# Patient Record
Sex: MALE | Race: WHITE | Employment: OTHER | URBAN - METROPOLITAN AREA
[De-identification: names, ages, dates, MRNs, and addresses within clinical notes are randomized per-mention and may not be internally consistent; named-entity substitution may affect disease eponyms.]

---

## 2017-07-01 ENCOUNTER — APPOINTMENT (OUTPATIENT)
Dept: MRI IMAGING | Facility: HOSPITAL | Age: 67
DRG: 023 | End: 2017-07-01
Attending: EMERGENCY MEDICINE
Payer: MEDICARE

## 2017-07-01 ENCOUNTER — APPOINTMENT (OUTPATIENT)
Dept: CV DIAGNOSTICS | Facility: HOSPITAL | Age: 67
DRG: 023 | End: 2017-07-01
Attending: Other
Payer: MEDICARE

## 2017-07-01 ENCOUNTER — HOSPITAL ENCOUNTER (INPATIENT)
Facility: HOSPITAL | Age: 67
LOS: 13 days | Discharge: NURSING, REHAB OR LONG-TERM CARE | DRG: 023 | End: 2017-07-14
Attending: EMERGENCY MEDICINE | Admitting: PEDIATRICS
Payer: MEDICARE

## 2017-07-01 ENCOUNTER — APPOINTMENT (OUTPATIENT)
Dept: CT IMAGING | Facility: HOSPITAL | Age: 67
End: 2017-07-01
Payer: MEDICARE

## 2017-07-01 ENCOUNTER — APPOINTMENT (OUTPATIENT)
Dept: GENERAL RADIOLOGY | Facility: HOSPITAL | Age: 67
End: 2017-07-01
Attending: EMERGENCY MEDICINE
Payer: MEDICARE

## 2017-07-01 ENCOUNTER — APPOINTMENT (OUTPATIENT)
Dept: INTERVENTIONAL RADIOLOGY/VASCULAR | Facility: HOSPITAL | Age: 67
DRG: 023 | End: 2017-07-01
Attending: PHYSICIAN ASSISTANT
Payer: MEDICARE

## 2017-07-01 ENCOUNTER — HOSPITAL ENCOUNTER (EMERGENCY)
Facility: HOSPITAL | Age: 67
Discharge: ANOTHER HOSPITAL | End: 2017-07-01
Attending: EMERGENCY MEDICINE
Payer: MEDICARE

## 2017-07-01 ENCOUNTER — APPOINTMENT (OUTPATIENT)
Dept: CT IMAGING | Facility: HOSPITAL | Age: 67
DRG: 023 | End: 2017-07-01
Attending: NURSE PRACTITIONER
Payer: MEDICARE

## 2017-07-01 ENCOUNTER — APPOINTMENT (OUTPATIENT)
Dept: CT IMAGING | Facility: HOSPITAL | Age: 67
DRG: 023 | End: 2017-07-01
Attending: Other
Payer: MEDICARE

## 2017-07-01 ENCOUNTER — APPOINTMENT (OUTPATIENT)
Dept: CT IMAGING | Facility: HOSPITAL | Age: 67
End: 2017-07-01
Attending: EMERGENCY MEDICINE
Payer: MEDICARE

## 2017-07-01 ENCOUNTER — APPOINTMENT (OUTPATIENT)
Dept: CT IMAGING | Facility: HOSPITAL | Age: 67
DRG: 023 | End: 2017-07-01
Payer: MEDICARE

## 2017-07-01 VITALS
BODY MASS INDEX: 23.39 KG/M2 | WEIGHT: 154.31 LBS | HEART RATE: 67 BPM | OXYGEN SATURATION: 97 % | RESPIRATION RATE: 18 BRPM | SYSTOLIC BLOOD PRESSURE: 132 MMHG | DIASTOLIC BLOOD PRESSURE: 78 MMHG | TEMPERATURE: 97 F | HEIGHT: 68 IN

## 2017-07-01 DIAGNOSIS — I77.71 DISSECTION OF CAROTID ARTERY (HCC): ICD-10-CM

## 2017-07-01 DIAGNOSIS — I63.9 ACUTE ISCHEMIC STROKE (HCC): Primary | ICD-10-CM

## 2017-07-01 DIAGNOSIS — I63.9 ACUTE CVA (CEREBROVASCULAR ACCIDENT) (HCC): Primary | ICD-10-CM

## 2017-07-01 DIAGNOSIS — I60.9 SUBARACHNOID BLEED (HCC): ICD-10-CM

## 2017-07-01 LAB
ANION GAP SERPL CALC-SCNC: 10 MMOL/L (ref 0–18)
APTT PPP: 28.1 SECONDS (ref 23.2–35.3)
BACTERIA UR QL AUTO: NEGATIVE /HPF
BASOPHILS # BLD: 0 K/UL (ref 0–0.2)
BASOPHILS NFR BLD: 0 %
BILIRUB UR QL: NEGATIVE
BUN SERPL-MCNC: 20 MG/DL (ref 8–20)
BUN/CREAT SERPL: 23.5 (ref 10–20)
CALCIUM SERPL-MCNC: 9.2 MG/DL (ref 8.5–10.5)
CHLORIDE SERPL-SCNC: 104 MMOL/L (ref 95–110)
CHOLEST SMN-MCNC: 125 MG/DL (ref ?–200)
CLARITY UR: CLEAR
CO2 SERPL-SCNC: 25 MMOL/L (ref 22–32)
COLOR UR: YELLOW
CREAT BLD-MCNC: 0.6 MG/DL (ref 0.5–1.5)
CREAT SERPL-MCNC: 0.85 MG/DL (ref 0.5–1.5)
EOSINOPHIL # BLD: 0 K/UL (ref 0–0.7)
EOSINOPHIL NFR BLD: 0 %
ERYTHROCYTE [DISTWIDTH] IN BLOOD BY AUTOMATED COUNT: 16.7 % (ref 11–15)
EST. AVERAGE GLUCOSE BLD GHB EST-MCNC: 97 MG/DL (ref 68–126)
GLUCOSE BLD-MCNC: 104 MG/DL (ref 65–99)
GLUCOSE BLD-MCNC: 104 MG/DL (ref 65–99)
GLUCOSE BLDC GLUCOMTR-MCNC: 118 MG/DL (ref 70–99)
GLUCOSE SERPL-MCNC: 116 MG/DL (ref 70–99)
GLUCOSE UR-MCNC: NEGATIVE MG/DL
HBA1C MFR BLD HPLC: 5 % (ref ?–5.7)
HCT VFR BLD AUTO: 36.1 % (ref 41–52)
HDLC SERPL-MCNC: 49 MG/DL (ref 45–?)
HDLC SERPL: 2.55 {RATIO} (ref ?–4.97)
HGB BLD-MCNC: 11.5 G/DL (ref 13.5–17.5)
HGB UR QL STRIP.AUTO: NEGATIVE
INR BLD: 1 (ref 0.9–1.2)
INR BLD: 1.17 (ref 0.89–1.11)
ISTAT ACTIVATED CLOTTING TIME: 125 SECONDS (ref 74–137)
ISTAT ACTIVATED CLOTTING TIME: 153 SECONDS (ref 74–137)
ISTAT ACTIVATED CLOTTING TIME: 153 SECONDS (ref 74–137)
ISTAT ACTIVATED CLOTTING TIME: 164 SECONDS (ref 74–137)
ISTAT ACTIVATED CLOTTING TIME: 175 SECONDS (ref 74–137)
ISTAT BLOOD GAS BASE DEFICIT: -3 MMOL/L
ISTAT BLOOD GAS HCO3: 22.5 MEQ/L (ref 22–26)
ISTAT BLOOD GAS O2 SATURATION: 94 % (ref 92–100)
ISTAT BLOOD GAS PCO2: 42 MMHG (ref 35–45)
ISTAT BLOOD GAS PH: 7.33 (ref 7.35–7.45)
ISTAT BLOOD GAS PO2: 76 MMHG (ref 80–105)
ISTAT BLOOD GAS TCO2: 24 MMOL/L (ref 22–32)
ISTAT HEMATOCRIT: 34 % (ref 37–54)
ISTAT IONIZED CALCIUM: 1.15 MMOL/L (ref 1.12–1.32)
ISTAT POTASSIUM: 3.2 MMOL/L (ref 3.6–5.1)
ISTAT SODIUM: 141 MMOL/L (ref 136–144)
LDLC SERPL CALC-MCNC: 66 MG/DL (ref ?–130)
LEUKOCYTE ESTERASE UR QL STRIP.AUTO: NEGATIVE
LYMPHOCYTES # BLD: 0.8 K/UL (ref 1–4)
LYMPHOCYTES NFR BLD: 7 %
MCH RBC QN AUTO: 20.2 PG (ref 27–32)
MCHC RBC AUTO-ENTMCNC: 31.8 G/DL (ref 32–37)
MCV RBC AUTO: 63.4 FL (ref 80–100)
MONOCYTES # BLD: 0.4 K/UL (ref 0–1)
MONOCYTES NFR BLD: 4 %
NEUTROPHILS # BLD AUTO: 10.3 K/UL (ref 1.8–7.7)
NEUTROPHILS NFR BLD: 89 %
NITRITE UR QL STRIP.AUTO: NEGATIVE
NONHDLC SERPL-MCNC: 76 MG/DL (ref ?–130)
OSMOLALITY UR CALC.SUM OF ELEC: 292 MOSM/KG (ref 275–295)
PH UR: 5 [PH] (ref 5–8)
PLATELET # BLD AUTO: 251 K/UL (ref 140–400)
PMV BLD AUTO: 8.8 FL (ref 7.4–10.3)
POTASSIUM SERPL-SCNC: 3.5 MMOL/L (ref 3.3–5.1)
PROT UR-MCNC: NEGATIVE MG/DL
PROTHROMBIN TIME: 12.9 SECONDS (ref 11.8–14.5)
PSA SERPL DL<=0.01 NG/ML-MCNC: 15 SECONDS (ref 12–14.3)
RBC # BLD AUTO: 5.69 M/UL (ref 4.5–5.9)
RBC #/AREA URNS AUTO: 2 /HPF
SODIUM SERPL-SCNC: 139 MMOL/L (ref 136–144)
SP GR UR STRIP: 1.02 (ref 1–1.03)
TRIGLYCERIDES: 49 MG/DL (ref ?–150)
TROPONIN I SERPL-MCNC: 0.01 NG/ML (ref ?–0.03)
TSI SER-ACNC: 2.62 MIU/ML (ref 0.35–5.5)
UROBILINOGEN UR STRIP-ACNC: <2
VIT C UR-MCNC: 40 MG/DL
VLDL: 10 MG/DL (ref 5–40)
WBC # BLD AUTO: 11.6 K/UL (ref 4–11)
WBC #/AREA URNS AUTO: 2 /HPF

## 2017-07-01 PROCEDURE — 99291 CRITICAL CARE FIRST HOUR: CPT

## 2017-07-01 PROCEDURE — 80048 BASIC METABOLIC PNL TOTAL CA: CPT | Performed by: EMERGENCY MEDICINE

## 2017-07-01 PROCEDURE — 70450 CT HEAD/BRAIN W/O DYE: CPT | Performed by: NURSE PRACTITIONER

## 2017-07-01 PROCEDURE — 70551 MRI BRAIN STEM W/O DYE: CPT | Performed by: EMERGENCY MEDICINE

## 2017-07-01 PROCEDURE — 70450 CT HEAD/BRAIN W/O DYE: CPT | Performed by: OTHER

## 2017-07-01 PROCEDURE — 85610 PROTHROMBIN TIME: CPT | Performed by: EMERGENCY MEDICINE

## 2017-07-01 PROCEDURE — 03CL3ZZ EXTIRPATION OF MATTER FROM LEFT INTERNAL CAROTID ARTERY, PERCUTANEOUS APPROACH: ICD-10-PCS

## 2017-07-01 PROCEDURE — 85060 BLOOD SMEAR INTERPRETATION: CPT | Performed by: EMERGENCY MEDICINE

## 2017-07-01 PROCEDURE — 93010 ELECTROCARDIOGRAM REPORT: CPT | Performed by: EMERGENCY MEDICINE

## 2017-07-01 PROCEDURE — 81003 URINALYSIS AUTO W/O SCOPE: CPT | Performed by: EMERGENCY MEDICINE

## 2017-07-01 PROCEDURE — 82330 ASSAY OF CALCIUM: CPT

## 2017-07-01 PROCEDURE — 82962 GLUCOSE BLOOD TEST: CPT

## 2017-07-01 PROCEDURE — 99285 EMERGENCY DEPT VISIT HI MDM: CPT

## 2017-07-01 PROCEDURE — 99291 CRITICAL CARE FIRST HOUR: CPT | Performed by: OTHER

## 2017-07-01 PROCEDURE — 85014 HEMATOCRIT: CPT

## 2017-07-01 PROCEDURE — 93306 TTE W/DOPPLER COMPLETE: CPT | Performed by: OTHER

## 2017-07-01 PROCEDURE — 84484 ASSAY OF TROPONIN QUANT: CPT | Performed by: EMERGENCY MEDICINE

## 2017-07-01 PROCEDURE — 70450 CT HEAD/BRAIN W/O DYE: CPT | Performed by: EMERGENCY MEDICINE

## 2017-07-01 PROCEDURE — 99223 1ST HOSP IP/OBS HIGH 75: CPT | Performed by: HOSPITALIST

## 2017-07-01 PROCEDURE — 85730 THROMBOPLASTIN TIME PARTIAL: CPT | Performed by: EMERGENCY MEDICINE

## 2017-07-01 PROCEDURE — 93005 ELECTROCARDIOGRAM TRACING: CPT

## 2017-07-01 PROCEDURE — 82565 ASSAY OF CREATININE: CPT

## 2017-07-01 PROCEDURE — 70450 CT HEAD/BRAIN W/O DYE: CPT

## 2017-07-01 PROCEDURE — 85347 COAGULATION TIME ACTIVATED: CPT

## 2017-07-01 PROCEDURE — 85025 COMPLETE CBC W/AUTO DIFF WBC: CPT | Performed by: EMERGENCY MEDICINE

## 2017-07-01 PROCEDURE — 70496 CT ANGIOGRAPHY HEAD: CPT | Performed by: EMERGENCY MEDICINE

## 2017-07-01 PROCEDURE — 70498 CT ANGIOGRAPHY NECK: CPT | Performed by: EMERGENCY MEDICINE

## 2017-07-01 PROCEDURE — 84295 ASSAY OF SERUM SODIUM: CPT

## 2017-07-01 PROCEDURE — 80307 DRUG TEST PRSMV CHEM ANLYZR: CPT | Performed by: EMERGENCY MEDICINE

## 2017-07-01 PROCEDURE — 84132 ASSAY OF SERUM POTASSIUM: CPT

## 2017-07-01 PROCEDURE — 36415 COLL VENOUS BLD VENIPUNCTURE: CPT

## 2017-07-01 PROCEDURE — 71010 XR CHEST AP PORTABLE  (CPT=71010): CPT | Performed by: EMERGENCY MEDICINE

## 2017-07-01 PROCEDURE — 82803 BLOOD GASES ANY COMBINATION: CPT

## 2017-07-01 PROCEDURE — B317YZZ FLUOROSCOPY OF LEFT INTERNAL CAROTID ARTERY USING OTHER CONTRAST: ICD-10-PCS

## 2017-07-01 PROCEDURE — 70544 MR ANGIOGRAPHY HEAD W/O DYE: CPT | Performed by: EMERGENCY MEDICINE

## 2017-07-01 RX ORDER — ACETAMINOPHEN 650 MG/1
650 SUPPOSITORY RECTAL EVERY 4 HOURS PRN
Status: DISCONTINUED | OUTPATIENT
Start: 2017-07-01 | End: 2017-07-01

## 2017-07-01 RX ORDER — DOCUSATE SODIUM 100 MG/1
100 CAPSULE, LIQUID FILLED ORAL 2 TIMES DAILY
Status: DISCONTINUED | OUTPATIENT
Start: 2017-07-01 | End: 2017-07-06

## 2017-07-01 RX ORDER — ASPIRIN 325 MG
325 TABLET, DELAYED RELEASE (ENTERIC COATED) ORAL DAILY
Status: DISCONTINUED | OUTPATIENT
Start: 2017-07-01 | End: 2017-07-02

## 2017-07-01 RX ORDER — ONDANSETRON 2 MG/ML
4 INJECTION INTRAMUSCULAR; INTRAVENOUS EVERY 6 HOURS PRN
Status: DISCONTINUED | OUTPATIENT
Start: 2017-07-01 | End: 2017-07-01

## 2017-07-01 RX ORDER — FAMOTIDINE 10 MG/ML
20 INJECTION, SOLUTION INTRAVENOUS DAILY
Status: DISCONTINUED | OUTPATIENT
Start: 2017-07-01 | End: 2017-07-01

## 2017-07-01 RX ORDER — FAMOTIDINE 10 MG/ML
20 INJECTION, SOLUTION INTRAVENOUS DAILY
Status: DISCONTINUED | OUTPATIENT
Start: 2017-07-01 | End: 2017-07-14

## 2017-07-01 RX ORDER — ACETAMINOPHEN 650 MG/1
650 SUPPOSITORY RECTAL EVERY 4 HOURS PRN
Status: DISCONTINUED | OUTPATIENT
Start: 2017-07-01 | End: 2017-07-03

## 2017-07-01 RX ORDER — MORPHINE SULFATE 4 MG/ML
INJECTION, SOLUTION INTRAMUSCULAR; INTRAVENOUS
Status: COMPLETED
Start: 2017-07-01 | End: 2017-07-01

## 2017-07-01 RX ORDER — ATORVASTATIN CALCIUM 80 MG/1
80 TABLET, FILM COATED ORAL NIGHTLY
Status: DISCONTINUED | OUTPATIENT
Start: 2017-07-01 | End: 2017-07-14

## 2017-07-01 RX ORDER — PHENYLEPHRINE HCL IN 0.9% NACL 50MG/250ML
PLASTIC BAG, INJECTION (ML) INTRAVENOUS
Status: DISCONTINUED
Start: 2017-07-01 | End: 2017-07-01

## 2017-07-01 RX ORDER — ACETAMINOPHEN 325 MG/1
650 TABLET ORAL EVERY 4 HOURS PRN
Status: DISCONTINUED | OUTPATIENT
Start: 2017-07-01 | End: 2017-07-01

## 2017-07-01 RX ORDER — BISACODYL 10 MG
10 SUPPOSITORY, RECTAL RECTAL
Status: DISCONTINUED | OUTPATIENT
Start: 2017-07-01 | End: 2017-07-14

## 2017-07-01 RX ORDER — LABETALOL HYDROCHLORIDE 5 MG/ML
10 INJECTION, SOLUTION INTRAVENOUS EVERY 10 MIN PRN
Status: DISCONTINUED | OUTPATIENT
Start: 2017-07-01 | End: 2017-07-01

## 2017-07-01 RX ORDER — FAMOTIDINE 20 MG/1
20 TABLET ORAL DAILY
Status: DISCONTINUED | OUTPATIENT
Start: 2017-07-01 | End: 2017-07-01

## 2017-07-01 RX ORDER — FAMOTIDINE 20 MG/1
20 TABLET ORAL DAILY
Status: DISCONTINUED | OUTPATIENT
Start: 2017-07-01 | End: 2017-07-14

## 2017-07-01 RX ORDER — LORAZEPAM 2 MG/ML
1 INJECTION INTRAMUSCULAR EVERY 6 HOURS PRN
Status: DISCONTINUED | OUTPATIENT
Start: 2017-07-01 | End: 2017-07-14

## 2017-07-01 RX ORDER — HEPARIN SODIUM 5000 [USP'U]/ML
INJECTION, SOLUTION INTRAVENOUS; SUBCUTANEOUS
Status: COMPLETED
Start: 2017-07-01 | End: 2017-07-01

## 2017-07-01 RX ORDER — SODIUM PHOSPHATE, DIBASIC AND SODIUM PHOSPHATE, MONOBASIC 7; 19 G/133ML; G/133ML
1 ENEMA RECTAL ONCE AS NEEDED
Status: DISCONTINUED | OUTPATIENT
Start: 2017-07-01 | End: 2017-07-14

## 2017-07-01 RX ORDER — MORPHINE SULFATE 4 MG/ML
1 INJECTION, SOLUTION INTRAMUSCULAR; INTRAVENOUS EVERY 2 HOUR PRN
Status: DISCONTINUED | OUTPATIENT
Start: 2017-07-01 | End: 2017-07-01

## 2017-07-01 RX ORDER — SODIUM CHLORIDE 9 MG/ML
INJECTION, SOLUTION INTRAVENOUS CONTINUOUS
Status: ACTIVE | OUTPATIENT
Start: 2017-07-01 | End: 2017-07-03

## 2017-07-01 RX ORDER — LABETALOL HYDROCHLORIDE 5 MG/ML
10 INJECTION, SOLUTION INTRAVENOUS EVERY 10 MIN PRN
Status: DISCONTINUED | OUTPATIENT
Start: 2017-07-01 | End: 2017-07-14

## 2017-07-01 RX ORDER — LIDOCAINE HYDROCHLORIDE 10 MG/ML
INJECTION, SOLUTION INFILTRATION; PERINEURAL
Status: COMPLETED
Start: 2017-07-01 | End: 2017-07-01

## 2017-07-01 RX ORDER — LORAZEPAM 2 MG/ML
INJECTION INTRAMUSCULAR
Status: COMPLETED
Start: 2017-07-01 | End: 2017-07-02

## 2017-07-01 RX ORDER — MORPHINE SULFATE 4 MG/ML
2 INJECTION, SOLUTION INTRAMUSCULAR; INTRAVENOUS EVERY 2 HOUR PRN
Status: DISCONTINUED | OUTPATIENT
Start: 2017-07-01 | End: 2017-07-14

## 2017-07-01 RX ORDER — MORPHINE SULFATE 4 MG/ML
2 INJECTION, SOLUTION INTRAMUSCULAR; INTRAVENOUS EVERY 2 HOUR PRN
Status: DISCONTINUED | OUTPATIENT
Start: 2017-07-01 | End: 2017-07-01

## 2017-07-01 RX ORDER — HALOPERIDOL 5 MG/ML
2 INJECTION INTRAMUSCULAR ONCE
Status: COMPLETED | OUTPATIENT
Start: 2017-07-01 | End: 2017-07-01

## 2017-07-01 RX ORDER — HALOPERIDOL 5 MG/ML
2 INJECTION INTRAMUSCULAR EVERY 6 HOURS PRN
Status: DISCONTINUED | OUTPATIENT
Start: 2017-07-02 | End: 2017-07-14

## 2017-07-01 RX ORDER — LORAZEPAM 2 MG/ML
1 INJECTION INTRAMUSCULAR ONCE
Status: DISCONTINUED | OUTPATIENT
Start: 2017-07-01 | End: 2017-07-02

## 2017-07-01 RX ORDER — ACETAMINOPHEN 325 MG/1
650 TABLET ORAL EVERY 4 HOURS PRN
Status: DISCONTINUED | OUTPATIENT
Start: 2017-07-01 | End: 2017-07-03

## 2017-07-01 RX ORDER — POLYETHYLENE GLYCOL 3350 17 G/17G
17 POWDER, FOR SOLUTION ORAL DAILY PRN
Status: DISCONTINUED | OUTPATIENT
Start: 2017-07-01 | End: 2017-07-14

## 2017-07-01 RX ORDER — SODIUM CHLORIDE 9 MG/ML
INJECTION, SOLUTION INTRAVENOUS CONTINUOUS
Status: DISCONTINUED | OUTPATIENT
Start: 2017-07-01 | End: 2017-07-01

## 2017-07-01 RX ORDER — METOCLOPRAMIDE HYDROCHLORIDE 5 MG/ML
10 INJECTION INTRAMUSCULAR; INTRAVENOUS EVERY 8 HOURS PRN
Status: DISCONTINUED | OUTPATIENT
Start: 2017-07-01 | End: 2017-07-14

## 2017-07-01 RX ORDER — LORAZEPAM 2 MG/ML
1 INJECTION INTRAMUSCULAR ONCE
Status: DISCONTINUED | OUTPATIENT
Start: 2017-07-01 | End: 2017-07-14

## 2017-07-01 RX ORDER — METOCLOPRAMIDE HYDROCHLORIDE 5 MG/ML
10 INJECTION INTRAMUSCULAR; INTRAVENOUS EVERY 8 HOURS PRN
Status: DISCONTINUED | OUTPATIENT
Start: 2017-07-01 | End: 2017-07-01

## 2017-07-01 RX ORDER — MORPHINE SULFATE 4 MG/ML
1 INJECTION, SOLUTION INTRAMUSCULAR; INTRAVENOUS EVERY 2 HOUR PRN
Status: DISCONTINUED | OUTPATIENT
Start: 2017-07-01 | End: 2017-07-14

## 2017-07-01 RX ORDER — ONDANSETRON 2 MG/ML
4 INJECTION INTRAMUSCULAR; INTRAVENOUS EVERY 6 HOURS PRN
Status: DISCONTINUED | OUTPATIENT
Start: 2017-07-01 | End: 2017-07-14

## 2017-07-01 RX ORDER — VERAPAMIL HYDROCHLORIDE 2.5 MG/ML
INJECTION, SOLUTION INTRAVENOUS
Status: COMPLETED
Start: 2017-07-01 | End: 2017-07-01

## 2017-07-01 RX ORDER — ASPIRIN 300 MG
300 SUPPOSITORY, RECTAL RECTAL DAILY
Status: DISCONTINUED | OUTPATIENT
Start: 2017-07-01 | End: 2017-07-02

## 2017-07-01 RX ORDER — SENNOSIDES 8.6 MG
17.2 TABLET ORAL NIGHTLY
Status: DISCONTINUED | OUTPATIENT
Start: 2017-07-01 | End: 2017-07-06

## 2017-07-01 RX ORDER — PHENYLEPHRINE HCL IN 0.9% NACL 50MG/250ML
PLASTIC BAG, INJECTION (ML) INTRAVENOUS CONTINUOUS PRN
Status: DISCONTINUED | OUTPATIENT
Start: 2017-07-01 | End: 2017-07-11 | Stop reason: ALTCHOICE

## 2017-07-01 NOTE — PROGRESS NOTES
07/01/17 0848   Clinical Encounter Type   Visited With Patient and family together   Routine Visit Introduction   Continue Visiting Yes   Crisis Visit ED  (Stroke Alert)   Patient's Supportive Strategies/Resources Family including children and spouse

## 2017-07-01 NOTE — PROGRESS NOTES
[place] ER called at 3799- Stroke yellow pt. From [facility] will be arriving. CT/CTA done at [address]  MCA partial thrombosis. Dr. Carmelina Denton notified from [facility]. IR team called in at [phone]. Arrived to dept at 8393. PT. arrived to [facility] ER at 8548.   Upon

## 2017-07-01 NOTE — ED NOTES
To MRI via cart with monitor , stroke RN, pct and RN. Pt alert at this time.  With expressive aphasia, denies pain

## 2017-07-01 NOTE — ED PROVIDER NOTES
Patient Seen in: [facility] AND [facility] Emergency Department    History   Patient presents with:  Altered Mental Status (neurologic)    Stated Complaint: ams    HPI  The patient is a 44-year-old male with no significant past medical history who presents with normal heart sounds and intact distal pulses. No murmur heard. Pulmonary/Chest: Effort normal and breath sounds normal.   Abdominal: Soft. Bowel sounds are normal. He exhibits no distension and no mass. Musculoskeletal: Normal range of motion.  He exh Preliminary result           Please view results for these tests on the individual orders.    DRUG ABUSE PANEL 10 SCREEN   MD BLOOD SMEAR CONSULT   RAINBOW DRAW BLUE   RAINBOW DRAW GOLD   RAINBOW DRAW LAVENDER   RAINBOW DRAW DARK GREEN   RAINBOW  with stable vital signs while in the emergency department.   Patient stable just prior to transfer  Disposition and Plan     Clinical Impression:  Acute CVA (cerebrovascular accident) ([facility] [address]  (primary encounter diagnosis)    Disposition:  Transfer to another

## 2017-07-01 NOTE — PROGRESS NOTES
07/01/17 5730   Clinical Encounter Type   Visited With Family  (Patient resting, RN requested not disturb him.   Prayer offered extended family at bedside.)   Routine Visit Follow-up   Continue Visiting Yes   Patient's Supportive Strategies/Resources Mouna

## 2017-07-01 NOTE — ED PROVIDER NOTES
Patient Seen in: [facility] Emergency Department    History   Patient presents with:  Stroke (neurologic)    Stated Complaint:     HPI    43-year-old male who is usually fairly healthy. He went to bed at 1030 last night.   His wife found him this mo and nontender. No abdominal masses. No peritoneal signs   Extremities: no edema, normal peripheral pulses   Neuro: Alert oriented ×2. Dense right hemiplegia. Right aphasia. Right-sided neglect.   NIH score = 12  Skin: no rashes or nodules         ED Co 14: 15     Approved by: Mya Page MD            Xr Chest Ap Portable  (cpt=71010)    Result Date: 7/1/2017  CONCLUSION:  1. Cardiomegaly. 2. Atherosclerosis. 3. Osteoarthritis.          Ct Stroke Brain (no Iv)(cpt=70450)    Result Date: 7/1/2017  CONCL Aristeo Maurice from neuro interventional was informed. Dr. Deandre Knox was also informed. Case discussed with Dr. Karina Carvalho from the [facility] hospitalist service.     Disposition and Plan     Clinical Impression:  Acute ischemic stroke ([facility] [address]  (primary encounter diagnos

## 2017-07-01 NOTE — PROGRESS NOTES
Anesthesia pre op  Unable to open anes pre eval  Allergies-sulfa  Meds - none  ROS negative  No anesthesia problems, no family Hx anes problems  PS 4E  Hx from wife- explained MAC +- sedation, GA with ETT, art line and PIV.  All questions answered  Gypsy Watson

## 2017-07-01 NOTE — CONSULTS
[facility]  Interventional Neuroradiology  Consultation Note    Dameon Walker Patient Status:  Emergency    1950 MRN [id number]   Location [address] [facility] Attending Trace Willis MD   Hosp Day # 0 [facility] Maldonado Click GENERAL:  Patient is a 77year old male in no acute distress.   HEENT:  Normocephalic, atraumatic  ABD: Soft, non tender    NEUROLOGICAL:    Mental status: AISHWARYA due to aphasia, seems responsive to name  Speech: aphasic, mild dysarthria  Memory and comprehe management          Coni Osborne, [address]  7/1/2017, 9:52 AM  Spectre [phone]      I saw and examined patient, agree with above and the following:    I discussed the procedure, indications, benefits, risks/complications, and alte

## 2017-07-01 NOTE — ED NOTES
Report given to Dylon Melvin at [facility]; Dr. Bull Burrell spoke to Dr. Caffie Buerger (neuro) regarding patient.

## 2017-07-01 NOTE — ED INITIAL ASSESSMENT (HPI)
Patient presents to ER via medics with c/o AMS. Per wife, the patient was found altered and incontinent on the sofa. Last known well time was 10:30 pm last night.

## 2017-07-01 NOTE — CONSULTS
[facility]  Neurocritical Care       Name: Dameon Walker  MRN: [id number]  Admission Date/Time: 7/1/2017  9:35 AM  Primary Care Provider:  Joel Florence        Reason for Consultation:   Acute LMCA stroke.     HPI:   Patient is a [employer]    Unable to obtain. Vitals:   Blood pressure 143/44, pulse 61, temperature 98.1 °F (36.7 °C), temperature source Temporal, resp. rate 16, height 68\", weight 160 lb, SpO2 99 %. Body mass index is 24.33 kg/m².     Intake/Output:  No intake or ou atrophy. WHITE MATTER: Mild chronic white matter ischemic changes. CEREBELLUM: No edema, hemorrhage, mass, acute infarction, or significant atrophy. BRAINSTEM: No edema, hemorrhage, mass, acute infarction, or significant atrophy.   CALVARIUM: No apparent dissection. LEFT INTERNAL CAROTID: No hemodynamically significant stenosis or dissection. VERTEBRAL ARTERIES: RIGHT: No hemodynamically significant stenosis or dissection. LEFT: No hemodynamically significant stenosis or dissection.    BASILAR ARTERY: N INDICATIONS:  eval for stroke  TECHNIQUE:  MRI of the brain was performed without intravenous gadolinium contrast. MR angiography using 3D time of flight without gadolinium contrast was also performed with multi-planar and 3D reconstructed images.  All santana although markedly diminished, left MCA.  Remainder of the intracranial MRA appears normal.   Dictated by: Radha Gaines MD on 7/01/2017 at 10:34     Approved by: Radha Gaines MD               Lab Review      Recent Labs   Lab  07/01/17   7292  07/01/17 minutes of critical care time (exclusive of billable procedures) was administered to manage and/or prevent neurologic instability.  This involved direct patient intervention, complex decision making, and/or extensive discussions with the patient, family, an

## 2017-07-01 NOTE — PROCEDURES
[facility]  Interventional Neuroradiology Procedure Note      Shania Meyers Patient Status:  Emergency    1950 MRN [id number]   Location [facility] [facility] Attending Kristal Mtz MD   Hosp Day # 0 [facility] Roshni Ingram

## 2017-07-01 NOTE — H&P
[facility]  History and Physical     Felicity Schneider Patient Status:  Inpatient    1950 MRN [id number]   [facility] 6NE-A Attending Leisa Melonie Valdovinos*   Hosp Day # 0 PCP Abilio Louis     Chief Complaint:  left upper rhonchi. Cardiovascular: S1, S2. Regular rate and rhythm. No murmurs, rubs or gallops. Equal pulses. Chest and Back: No tenderness or deformity. Abdomen: Soft, nontender, nondistended. Positive bowel sounds. No rebound, guarding or organomegaly.   Kena Marcus Certification    Patient will require inpatient services that will reasonably be expected to span two midnight's based on the clinical documentation in H+P.    Based on patients current state of illness, I anticipate that, after discharge, patient will requ

## 2017-07-01 NOTE — ED INITIAL ASSESSMENT (HPI)
Pt here via ems after being found with stool all over him at home today with right sided weakness. Pt was found by wife. Last know normal was 1030 last night.  Pt arrived via ems after going to [facility] this am. +expressive aphasia. +garbled speech

## 2017-07-02 ENCOUNTER — APPOINTMENT (OUTPATIENT)
Dept: MRI IMAGING | Facility: HOSPITAL | Age: 67
DRG: 023 | End: 2017-07-02
Payer: MEDICARE

## 2017-07-02 ENCOUNTER — APPOINTMENT (OUTPATIENT)
Dept: CT IMAGING | Facility: HOSPITAL | Age: 67
DRG: 023 | End: 2017-07-02
Attending: NURSE PRACTITIONER
Payer: MEDICARE

## 2017-07-02 LAB
ALBUMIN SERPL-MCNC: 3.3 G/DL (ref 3.5–4.8)
ALLENS TEST: POSITIVE
ALP LIVER SERPL-CCNC: 51 U/L (ref 45–117)
ALT SERPL-CCNC: 28 U/L (ref 17–63)
APTT PPP: 31.4 SECONDS (ref 25–34)
ARTERIAL BLD GAS O2 SATURATION: 95 % (ref 92–100)
ARTERIAL BLOOD GAS BASE EXCESS: -1.2
ARTERIAL BLOOD GAS HCO3: 22.5 MEQ/L (ref 22–26)
ARTERIAL BLOOD GAS PCO2: 34 MM HG (ref 35–45)
ARTERIAL BLOOD GAS PH: 7.44 (ref 7.35–7.45)
ARTERIAL BLOOD GAS PO2: 77 MM HG (ref 80–105)
AST SERPL-CCNC: 36 U/L (ref 15–41)
BILIRUB SERPL-MCNC: 1.3 MG/DL (ref 0.1–2)
BILIRUB UR QL STRIP.AUTO: NEGATIVE
BUN BLD-MCNC: 9 MG/DL (ref 8–20)
CALCIUM BLD-MCNC: 7.6 MG/DL (ref 8.3–10.3)
CALCULATED O2 SATURATION: 96 % (ref 92–100)
CARBOXYHEMOGLOBIN: 1.5 % SAT (ref 0–3)
CHLORIDE: 108 MMOL/L (ref 101–111)
CLARITY UR REFRACT.AUTO: CLEAR
CO2: 27 MMOL/L (ref 22–32)
CREAT BLD-MCNC: 0.62 MG/DL (ref 0.7–1.3)
ERYTHROCYTE [DISTWIDTH] IN BLOOD BY AUTOMATED COUNT: 17.4 % (ref 11.5–16)
FIO2: 21 %
GLUCOSE BLD-MCNC: 100 MG/DL (ref 70–99)
GLUCOSE BLD-MCNC: 105 MG/DL (ref 65–99)
GLUCOSE BLD-MCNC: 108 MG/DL (ref 65–99)
GLUCOSE BLD-MCNC: 111 MG/DL (ref 65–99)
GLUCOSE UR STRIP.AUTO-MCNC: NEGATIVE MG/DL
HAV IGM SER QL: 1.7 MG/DL (ref 1.7–3)
HCT VFR BLD AUTO: 30.1 % (ref 37–53)
HGB BLD-MCNC: 9.8 G/DL (ref 13–17)
INR BLD: 1.24 (ref 0.89–1.11)
IONIZED CALCIUM: 1.09 MMOL/L (ref 1.12–1.32)
KETONES UR STRIP.AUTO-MCNC: 20 MG/DL
LACTIC ACID ARTERIAL: 1.7 MMOL/L (ref 0.5–2)
M PROTEIN MFR SERPL ELPH: 6.3 G/DL (ref 6.1–8.3)
MCH RBC QN AUTO: 20 PG (ref 27–33.2)
MCHC RBC AUTO-ENTMCNC: 32.6 G/DL (ref 31–37)
MCV RBC AUTO: 61.4 FL (ref 80–99)
METHEMOGLOBIN: 0.6 % SAT (ref 0.4–1.5)
NITRITE UR QL STRIP.AUTO: NEGATIVE
PATIENT TEMPERATURE: 99.1 F
PH UR STRIP.AUTO: 7 [PH] (ref 4.5–8)
PHOSPHATE SERPL-MCNC: 2.4 MG/DL (ref 2.5–4.9)
PLATELET # BLD AUTO: 279 10(3)UL (ref 150–450)
POTASSIUM BLOOD GAS: 3.3 MMOL/L (ref 3.6–5.1)
POTASSIUM SERPL-SCNC: 3.3 MMOL/L (ref 3.6–5.1)
PROT UR STRIP.AUTO-MCNC: NEGATIVE MG/DL
PSA SERPL DL<=0.01 NG/ML-MCNC: 15.7 SECONDS (ref 12–14.3)
RBC # BLD AUTO: 4.9 X10(6)UL (ref 3.8–5.8)
RED CELL DISTRIBUTION WIDTH-SD: 35.7 FL (ref 35.1–46.3)
SODIUM BLOOD GAS: 138 MMOL/L (ref 136–144)
SODIUM SERPL-SCNC: 141 MMOL/L (ref 136–144)
SP GR UR STRIP.AUTO: 1.01 (ref 1–1.03)
TOTAL HEMOGLOBIN: 10 G/DL (ref 12.6–17.4)
UROBILINOGEN UR STRIP.AUTO-MCNC: <2 MG/DL
WBC # BLD AUTO: 12 X10(3) UL (ref 4–13)

## 2017-07-02 PROCEDURE — 99291 CRITICAL CARE FIRST HOUR: CPT | Performed by: OTHER

## 2017-07-02 PROCEDURE — 70551 MRI BRAIN STEM W/O DYE: CPT

## 2017-07-02 PROCEDURE — 85027 COMPLETE CBC AUTOMATED: CPT | Performed by: PHYSICIAN ASSISTANT

## 2017-07-02 PROCEDURE — 84100 ASSAY OF PHOSPHORUS: CPT | Performed by: NURSE PRACTITIONER

## 2017-07-02 PROCEDURE — 82962 GLUCOSE BLOOD TEST: CPT

## 2017-07-02 PROCEDURE — 85610 PROTHROMBIN TIME: CPT | Performed by: NURSE PRACTITIONER

## 2017-07-02 PROCEDURE — 70450 CT HEAD/BRAIN W/O DYE: CPT | Performed by: NURSE PRACTITIONER

## 2017-07-02 PROCEDURE — 99232 SBSQ HOSP IP/OBS MODERATE 35: CPT | Performed by: HOSPITALIST

## 2017-07-02 PROCEDURE — 85730 THROMBOPLASTIN TIME PARTIAL: CPT | Performed by: NURSE PRACTITIONER

## 2017-07-02 PROCEDURE — 80053 COMPREHEN METABOLIC PANEL: CPT | Performed by: OTHER

## 2017-07-02 PROCEDURE — 83735 ASSAY OF MAGNESIUM: CPT | Performed by: OTHER

## 2017-07-02 RX ORDER — ACETAMINOPHEN 325 MG/1
650 TABLET ORAL EVERY 4 HOURS PRN
Status: DISCONTINUED | OUTPATIENT
Start: 2017-07-02 | End: 2017-07-14

## 2017-07-02 RX ORDER — ACETAMINOPHEN 160 MG/5ML
650 SOLUTION ORAL EVERY 4 HOURS PRN
Status: DISCONTINUED | OUTPATIENT
Start: 2017-07-02 | End: 2017-07-14

## 2017-07-02 RX ORDER — ASPIRIN 325 MG
325 TABLET, DELAYED RELEASE (ENTERIC COATED) ORAL DAILY
Status: DISCONTINUED | OUTPATIENT
Start: 2017-07-02 | End: 2017-07-03

## 2017-07-02 RX ORDER — ASPIRIN 325 MG
325 TABLET, DELAYED RELEASE (ENTERIC COATED) ORAL DAILY
Status: DISCONTINUED | OUTPATIENT
Start: 2017-07-03 | End: 2017-07-02

## 2017-07-02 RX ORDER — ASPIRIN 300 MG
300 SUPPOSITORY, RECTAL RECTAL DAILY
Status: DISCONTINUED | OUTPATIENT
Start: 2017-07-02 | End: 2017-07-03

## 2017-07-02 RX ORDER — DIAZEPAM 5 MG/ML
2.5 INJECTION, SOLUTION INTRAMUSCULAR; INTRAVENOUS ONCE
Status: COMPLETED | OUTPATIENT
Start: 2017-07-02 | End: 2017-07-02

## 2017-07-02 RX ORDER — ACETAMINOPHEN 650 MG/1
650 SUPPOSITORY RECTAL EVERY 4 HOURS PRN
Status: DISCONTINUED | OUTPATIENT
Start: 2017-07-02 | End: 2017-07-14

## 2017-07-02 RX ORDER — HALOPERIDOL 5 MG/ML
1 INJECTION INTRAMUSCULAR EVERY 4 HOURS PRN
Status: DISCONTINUED | OUTPATIENT
Start: 2017-07-02 | End: 2017-07-14

## 2017-07-02 RX ORDER — ASPIRIN 300 MG
300 SUPPOSITORY, RECTAL RECTAL DAILY
Status: DISCONTINUED | OUTPATIENT
Start: 2017-07-03 | End: 2017-07-02

## 2017-07-02 RX ORDER — DEXMEDETOMIDINE HYDROCHLORIDE 4 UG/ML
INJECTION, SOLUTION INTRAVENOUS CONTINUOUS
Status: DISCONTINUED | OUTPATIENT
Start: 2017-07-02 | End: 2017-07-03

## 2017-07-02 NOTE — PHYSICAL THERAPY NOTE
PT evaluation orders received. Awaiting clarification of activity orders. Will follow-up when appropriate.

## 2017-07-02 NOTE — PROGRESS NOTES
[facility]  Neurocritical Care       Subjective: Ty Silvestre is a(n) 77year old male with no PMH Acute LMCA stroke, LICA dissection s/p mechanical thrombectomy with TICI3 reperfusion.  Presence of contrast vs SAH in left sylvian fissur leg rigidity. FINDINGS:   VENTRICLES/SULCI:  Ventricles and sulci are normal in size.   INTRACRANIAL:  There is little significant change in the evolving infarct of the left temporal lobe extending into the left frontoparietal lobe except that the infarc [facility] FINDINGS:   VENTRICLES/SULCI:  Ventricles and sulci are normal in size. INTRACRANIAL:  There is development of low attenuation with loss of gray white matter differentiation in the left temporal lobe extending into the left frontoparietal lobe.  This patient was scanned with CT immediately following extensive neurointerventional revascularization procedure, and is hyperdensity can be due to a combination of vascular permeability related to both the left MCA infarct and the interventional manipulation. OTHER: Negative. Dictated by (CST): Alyson Thorpe MD on 7/01/2017 at 8:46     Approved by (CST): Alyson Thorpe MD on 7/01/2017 at 8:48          CONCLUSION:  1. Cardiomegaly. 2. Atherosclerosis. 3. Osteoarthritis.          Ct Stroke Brain (no Iv)(c IV) STROKE (QWQ=20491/22321)  COMPARISON: [facility], CT STROKE BRAIN (NO IV) (CPT=70450), 7/01/2017, 7:43. INDICATIONS: Altered mental status changes. Stroke.  TIA/ischemia  TECHNIQUE: CT images of the neck and brain were obtained without at 9:44          CONCLUSION:  1. Findings consistent with partial thrombosis of the left middle cerebral artery corresponding to the noncontrast CT findings described previously.  2. Otherwise, normal visualization of the right middle cerebral, right and le cerebral artery, with scant diminished signal blood flow present within M1, M2 and minimally within some peripheral branches, resulting from blood flow by a [place] collaterals.   The anterior and posterior cerebral arteries are patent bilaterally, CA  9.2   --   7.6*   ALB   --    --   3.3*   NA  139   --   141   K  3.5   --   3.3*   CL  104   --   108   CO2  25   --   27.0   ALKPHO   --    --   51   AST   --    --   36   ALT   --    --   28   BILT   --    --   1.3   TP   --    --   6.3       Medi aspirin EC EC tab 325 mg 325 mg Oral Daily   Or      aspirin 300 MG rectal suppository 300 mg 300 mg Rectal Daily   haloperidol lactate (HALDOL) 5 MG/ML injection 2 mg 2 mg Intravenous Q6H PRN   LORazepam (ATIVAN) injection 1 mg 1 mg Intravenous Once   l 120-140  · EKG and cardiac monitor   · Echo      Pulmonary:  · RA   Renal:  · BUN/Cr 9/0.62  · Monitor I/O’s     Intake/Output Summary (Last 24 hours) at 07/02/17 1029  Last data filed at 07/02/17 0400   Gross per 24 hour   Intake             2099 ml   Out

## 2017-07-02 NOTE — PLAN OF CARE
1350 receiveded from NI lab accompanied of anesth. cardene stopped, cristy gtt started to keep bp parameters 120-140mmHg . Left radial a-line. Ledezma. IVF started HOB flat. Right groin site soft, no hematoma, open to air.      Pt alert, restless and slightly ag

## 2017-07-02 NOTE — PROGRESS NOTES
Called to room by RN for increased irritability, agitation and muscle contraction of right leg. Haldol given without result. Upon arrival noted muscle spasms in the right leg along with decerebrate posturing.   Ativan given and patient taken down for CT B 2. Slight better definition of the left MCA distribution infarct involving the temporal lobe and the frontoparietal lobe. There may be slightly more edema in the left cerebral hemisphere with a rightward midline shift of 2 mm.      After scan patient less r

## 2017-07-02 NOTE — PROGRESS NOTES
[facility]  Progress Note     Ashley Bingham Patient Status:  Inpatient    1950 MRN [id number]   [facility] 6NE-A Attending Marciana Buerger [address] Day # 1 PCP Trace Fortune     Chief Complaint: Acute stroke    S: Pa BILT   --    --   1.3   TP   --    --   6.3       Estimated Creatinine Clearance: 113.4 mL/min (based on SCr of 0.62 mg/dL).     Recent Labs   Lab  07/01/17   0740  07/01/17   1457  07/02/17   0400   PTP  12.9  15.0*  15.7*   INR  1.0  1.17*  1.24*

## 2017-07-02 NOTE — PROGRESS NOTES
[facility]  Interventional Neuroradiology Progress Note    Sarbjit Wilkerson Patient Status:  Inpatient    1950 MRN [id number]   [facility] 6NE-A Attending Richard Hinton [address]       Subjective CA 7.6 07/02/2017   ALB 3.3 07/02/2017   ALKPHO 51 07/02/2017   BILT 1.3 07/02/2017   TP 6.3 07/02/2017   AST 36 07/02/2017   ALT 28 07/02/2017   PTT 31.4 07/02/2017   INR 1.24 07/02/2017   PTP 15.7 07/02/2017   TSH 2.620 07/01/2017   MG 1.7 07/02/2017 corresponding to the noncontrast CT findings described previously. 2. Otherwise, normal visualization of the right middle cerebral, right and left anterior and posters to arteries.   3. No significant atherosclerotic changes of the common or internal carot

## 2017-07-03 ENCOUNTER — APPOINTMENT (OUTPATIENT)
Dept: GENERAL RADIOLOGY | Facility: HOSPITAL | Age: 67
DRG: 023 | End: 2017-07-03
Attending: Other
Payer: MEDICARE

## 2017-07-03 ENCOUNTER — APPOINTMENT (OUTPATIENT)
Dept: CT IMAGING | Facility: HOSPITAL | Age: 67
DRG: 023 | End: 2017-07-03
Attending: PHYSICIAN ASSISTANT
Payer: MEDICARE

## 2017-07-03 PROBLEM — G93.6 CEREBRAL EDEMA (HCC): Status: ACTIVE | Noted: 2017-07-03

## 2017-07-03 PROBLEM — E87.6 HYPOKALEMIA: Status: ACTIVE | Noted: 2017-07-03

## 2017-07-03 LAB
BASOPHILS # BLD AUTO: 0.03 X10(3) UL (ref 0–0.1)
BASOPHILS NFR BLD AUTO: 0.2 %
BUN BLD-MCNC: 10 MG/DL (ref 8–20)
CALCIUM BLD-MCNC: 7.9 MG/DL (ref 8.3–10.3)
CHLORIDE: 107 MMOL/L (ref 101–111)
CO2: 26 MMOL/L (ref 22–32)
CREAT BLD-MCNC: 0.63 MG/DL (ref 0.7–1.3)
DEPRECATED HBV CORE AB SER IA-ACNC: 157.8 NG/ML (ref 22–322)
EOSINOPHIL # BLD AUTO: 0.04 X10(3) UL (ref 0–0.3)
EOSINOPHIL NFR BLD AUTO: 0.3 %
ERYTHROCYTE [DISTWIDTH] IN BLOOD BY AUTOMATED COUNT: 17.7 % (ref 11.5–16)
GLUCOSE BLD-MCNC: 102 MG/DL (ref 65–99)
GLUCOSE BLD-MCNC: 103 MG/DL (ref 65–99)
GLUCOSE BLD-MCNC: 107 MG/DL (ref 65–99)
GLUCOSE BLD-MCNC: 108 MG/DL (ref 65–99)
GLUCOSE BLD-MCNC: 93 MG/DL (ref 70–99)
GLUCOSE BLD-MCNC: 99 MG/DL (ref 65–99)
HAV AB SERPL IA-ACNC: 268 PG/ML (ref 193–986)
HAV IGM SER QL: 2.1 MG/DL (ref 1.7–3)
HCT VFR BLD AUTO: 29.4 % (ref 37–53)
HGB BLD-MCNC: 9.4 G/DL (ref 13–17)
IMMATURE GRANULOCYTE COUNT: 0.09 X10(3) UL (ref 0–1)
IMMATURE GRANULOCYTE RATIO %: 0.7 %
INR BLD: 1.24 (ref 0.89–1.11)
IRON SATURATION: 15 % (ref 13–45)
IRON: 40 UG/DL (ref 45–182)
LYMPHOCYTES # BLD AUTO: 1.65 X10(3) UL (ref 0.9–4)
LYMPHOCYTES NFR BLD AUTO: 13.6 %
MCH RBC QN AUTO: 20 PG (ref 27–33.2)
MCHC RBC AUTO-ENTMCNC: 32 G/DL (ref 31–37)
MCV RBC AUTO: 62.7 FL (ref 80–99)
MONOCYTES # BLD AUTO: 1.04 X10(3) UL (ref 0.1–0.6)
MONOCYTES NFR BLD AUTO: 8.6 %
NEUTROPHIL ABS PRELIM: 9.24 X10 (3) UL (ref 1.3–6.7)
NEUTROPHILS # BLD AUTO: 9.24 X10(3) UL (ref 1.3–6.7)
NEUTROPHILS NFR BLD AUTO: 76.6 %
PLATELET # BLD AUTO: 245 10(3)UL (ref 150–450)
POTASSIUM SERPL-SCNC: 3.3 MMOL/L (ref 3.6–5.1)
POTASSIUM SERPL-SCNC: 3.3 MMOL/L (ref 3.6–5.1)
PSA SERPL DL<=0.01 NG/ML-MCNC: 15.7 SECONDS (ref 12–14.3)
RBC # BLD AUTO: 4.69 X10(6)UL (ref 3.8–5.8)
RED CELL DISTRIBUTION WIDTH-SD: 36.6 FL (ref 35.1–46.3)
SODIUM SERPL-SCNC: 141 MMOL/L (ref 136–144)
TOTAL IRON BINDING CAPACITY: 265 UG/DL (ref 298–536)
TRANSFERRIN: 178 MG/DL (ref 200–360)
WBC # BLD AUTO: 12.1 X10(3) UL (ref 4–13)

## 2017-07-03 PROCEDURE — 82962 GLUCOSE BLOOD TEST: CPT

## 2017-07-03 PROCEDURE — 95951 EEG MONITORING/VIDEORECORD: CPT | Performed by: OTHER

## 2017-07-03 PROCEDURE — 70496 CT ANGIOGRAPHY HEAD: CPT | Performed by: PHYSICIAN ASSISTANT

## 2017-07-03 PROCEDURE — 99232 SBSQ HOSP IP/OBS MODERATE 35: CPT | Performed by: HOSPITALIST

## 2017-07-03 PROCEDURE — 70498 CT ANGIOGRAPHY NECK: CPT | Performed by: PHYSICIAN ASSISTANT

## 2017-07-03 PROCEDURE — 71010 XR CHEST AP PORTABLE  (CPT=71010): CPT | Performed by: OTHER

## 2017-07-03 PROCEDURE — 99291 CRITICAL CARE FIRST HOUR: CPT | Performed by: OTHER

## 2017-07-03 RX ORDER — ASPIRIN 300 MG
300 SUPPOSITORY, RECTAL RECTAL DAILY
Status: DISCONTINUED | OUTPATIENT
Start: 2017-07-03 | End: 2017-07-13

## 2017-07-03 RX ORDER — HEPARIN SODIUM AND DEXTROSE 10000; 5 [USP'U]/100ML; G/100ML
12 INJECTION INTRAVENOUS ONCE
Status: DISCONTINUED | OUTPATIENT
Start: 2017-07-03 | End: 2017-07-03

## 2017-07-03 RX ORDER — ASPIRIN 325 MG
325 TABLET, DELAYED RELEASE (ENTERIC COATED) ORAL DAILY
Status: DISCONTINUED | OUTPATIENT
Start: 2017-07-03 | End: 2017-07-13

## 2017-07-03 RX ORDER — HEPARIN SODIUM 5000 [USP'U]/ML
5000 INJECTION, SOLUTION INTRAVENOUS; SUBCUTANEOUS EVERY 8 HOURS SCHEDULED
Status: DISCONTINUED | OUTPATIENT
Start: 2017-07-03 | End: 2017-07-13

## 2017-07-03 RX ORDER — HEPARIN SODIUM AND DEXTROSE 10000; 5 [USP'U]/100ML; G/100ML
INJECTION INTRAVENOUS CONTINUOUS
Status: DISCONTINUED | OUTPATIENT
Start: 2017-07-03 | End: 2017-07-03

## 2017-07-03 RX ORDER — ORPHENADRINE CITRATE 30 MG/ML
60 INJECTION INTRAMUSCULAR; INTRAVENOUS EVERY 12 HOURS PRN
Status: DISCONTINUED | OUTPATIENT
Start: 2017-07-03 | End: 2017-07-14

## 2017-07-03 NOTE — PLAN OF CARE
Pt has remained neurologically stable throughout the day. Pt often sleepy but arousable to tactile and verbal stimuli.   Pt with receptive and expressive aphasia understanding about 50-75% of commands and only able to verbalize with sounds ie \"uh-huh\" an

## 2017-07-03 NOTE — CM/SW NOTE
MSW left a message for the patient's wife to complete dc planning assessment. Awaiting return call.      Kirby Jarvis \[facility]\""NGHIA

## 2017-07-03 NOTE — DIETARY NOTE
NUTRITION INITIAL ASSESSMENT    Pt is at moderate nutrition risk. Pt does not meet malnutrition criteria.     NUTRITION DIAGNOSIS/PROBLEM:    Inadequate oral intake related to inability to consume sufficient energy as evidenced by need for TF, SLP recommend protein per kg)  Fluid: ~1 ml/kcal or per MD discretion    MONITOR AND EVALUATE/NUTRITION GOALS:  3. No signs of skin breakdown  4. Maintain lean body mass  5.  Tolerance of enteral nutrition without signs/symptoms of intolerance (abdominal discomfort/diste

## 2017-07-03 NOTE — PLAN OF CARE
Patient drowsy, easily arousable. Follows simple commands; patient nods or shakes his head, lift up his left arm and left leg. No movement of the right arm but with a slight movement noted on the right leg.  Patient has a low grade temperature and very restl

## 2017-07-03 NOTE — PHYSICAL THERAPY NOTE
Spoke with RN about needing activity orders from neuro prior to initialing PT evaluation. RN in agreement and will f/u after pt's CT scan. Will f/u with pt as schedule permits.

## 2017-07-03 NOTE — PROGRESS NOTES
[facility]  Progress Note     Tyson Younger Patient Status:  Inpatient    1950 MRN [id number]   [facility] 6NE-A Attending Frankie [address]ap[address] Day # 2 PCP Elaezar Kerr     Chief Complaint: Acute stroke    S: Pa --   36   --    ALT   --    --   28   --    BILT   --    --   1.3   --    TP   --    --   6.3   --        Estimated Creatinine Clearance: 111.6 mL/min (based on SCr of 0.63 mg/dL).     Recent Labs   Lab  07/01/17   1457  07/02/17   0400  07/03/17   0427

## 2017-07-03 NOTE — PROGRESS NOTES
[facility] Neurocritical care Progress Note    Eduardo Craig Patient Status:  Inpatient    1950 MRN [id number]   [facility] 6NE-A Attending Hema [facility] Day # 2 PCP Arpit Mayberry     Subjective:  Fr --    GFRNAA  >60  >60   --    --    CA  9.2   --   7.6*  7.9*   ALB   --    --   3.3*   --    NA  139   --   141  141   K  3.5   --   3.3*  3.3*  3.3*   CL  104   --   108  107   CO2  25   --   27.0  26.0   ALKPHO   --    --   51   --    AST   --    -- -140, home medication on hold  - asymptomatic bradycardia in sleep  - monitor vitals    5. Hematology  - monitor H/H, likely has iron def anemia    6.  Renal    Intake/Output Summary (Last 24 hours) at 07/03/17 1043  Last data filed at 07/03/17 0700

## 2017-07-03 NOTE — PROGRESS NOTES
07/03/17 1340   Clinical Encounter Type   Visited With Patient and family together   Anglican Encounters   Anglican Needs Prayer;Bible   Sacramental Encounters   Sacrament of Sick-Anointing Visiting  anointed patient

## 2017-07-03 NOTE — PROGRESS NOTES
[facility]  Interventional Neuroradiology Progress Note    Cape Neddicktierney Alvaradojaison Patient Status:  Inpatient    1950 MRN [id number]   [facility] [place] 6NE-A Attending Alejandro Walters [address] Day # 2 PCP Laura Canales     CC:   BRADY YUSUF HCT 29.4 07/03/2017   .0 07/03/2017   CREATSERUM 0.63 07/03/2017   BUN 10 07/03/2017    07/03/2017   K 3.3 07/03/2017   K 3.3 07/03/2017    07/03/2017   CO2 26.0 07/03/2017   GLU 93 07/03/2017   CA 7.9 07/03/2017   INR 1.24 07/03/2017 - no on statin prior to stroke    Plan:  1. Will repeat CTA Brain + Neck on Friday, monitor small aneurysm  2.  Ischemic order set in place   - ok to continue ASA 300mg IL until passes swallow   - Atorvastatin 80 mg ordered, has not been receiving as he rem

## 2017-07-03 NOTE — CM/SW NOTE
07/03/17 1100   CM/SW Referral Data   Referral Source Physician   Reason for Referral Discharge planning   Informant Spouse   Social History   Recreational Drug/Alcohol Use no   Major Changes Last 6 Months no   Domestic/Partner Violence no   Suicidal Id

## 2017-07-03 NOTE — PLAN OF CARE
New blood pressure parameter per Dr. Annika Feliz is to keep systolic BP between [id number]. Will titrate down neosynephrine as tolerated.

## 2017-07-03 NOTE — OCCUPATIONAL THERAPY NOTE
OCCUPATIONAL THERAPY EVALUATION - INPATIENT     Room Number: 1088/1851-M  Evaluation Date: 7/3/2017  Type of Evaluation: Initial  Presenting Problem: Acute Ischemic Stroke    Physician Order: IP Consult to Occupational Therapy  Reason for Therapy: ADL/IADL Cognitive Status:  Impaired  Arousal/Alertness:  inconsistent responses to stimuli  Attention Span:  difficulty attending to directions and difficulty dividing attention  Orientation Level:  disoriented to place, disoriented to time and disoriented to situ Toileting, which includes using toilet, bedpan or urinal? : Total  -   Putting on and taking off regular upper body clothing?: Total  -   Taking care of personal grooming such as brushing teeth?: Total  -   Eating meals?: Total    AM-PAC Score:  Score: 6 from skilled inpatient OT to address the above deficits, maximizing patient's ability to return to prior level of function. Recommend continued intensive inpatient rehab in an acute rehab setting.   This would aim to maximize independence/safety with self-c

## 2017-07-03 NOTE — OCCUPATIONAL THERAPY NOTE
Spoke with RN about needing activity orders from neuro. RN in agreement and will f/u after pt's CT scan. Will f/u with pt as schedule permits.

## 2017-07-03 NOTE — PROCEDURES
VIDEO ELECTROENCEPHALOGRAM REPORT    Patient Name: Eduardo Craig  Chart ID: [id number]  Ordering Physician: Dr Ekta Cedillo              Start Date: 7/2/2017  Referring Physician: Dr Monika Philippe    End Date: 7/3/2017  Patient Diagnosis: Left MCA stroke.  Rule out foca events noted. All of these noted events correlates with artifact, including movement, muscle, and electrode artifact. SPIKE FILES:  There were no spike files noted.   All of these noted events correlates with artifact, including movement, muscle, and el

## 2017-07-03 NOTE — PROGRESS NOTES
Discussed MRI results with Dr. Parisa Beasley. No new orders. Results relayed to family. All questions answered.       Alicia COSME  Critical Care Nurse Practitioner  [address]

## 2017-07-03 NOTE — SLP NOTE
ADULT SWALLOWING EVALUATION    ASSESSMENT & PLAN   ASSESSMENT  Pt seen at bedside this AM for bedside swallow evaluation. Speech consulted per CVA protocol and failed RN dysphagia screening. Pt cooperative and lethargic.  Pt required constant cues to remain screen    Problem List  Principal Problem:    Acute ischemic stroke [facility])  Active Problems:    Subarachnoid bleed [facility])      Past Medical History  History reviewed. No pertinent past medical history.     Prior Living Situation: Home with support  Diet Prior today.     Acute hemorrhage in the left sylvian fissure and subarachnoid hemorrhage involving the left temporal and parietal lobes redemonstrated without significant change when correlated with CT performed earlier today.           OBJECTIVE   ORAL MOTOR EX

## 2017-07-03 NOTE — PHYSICAL THERAPY NOTE
PHYSICAL THERAPY EVALUATION - INPATIENT     Room Number: 1109/0304-A  Evaluation Date: 7/3/2017  Type of Evaluation: Initial  Physician Order: PT Eval and Treat    Presenting Problem:  (acute CVA with possible SAH conversion)  Reason for Therapy: Mobil patient    SUBJECTIVE  responds yes  And no inconsistently    Patient self-stated goal is unable to state    OBJECTIVE  Precautions: Seizure (-140, currently on bedrest), receptive/expressive aphasia       WEIGHT BEARING RESTRICTION to and from a bed to a chair (including a wheelchair)?: Total   -   Need to walk in hospital room?: Total   -   Climbing 3-5 steps with a railing?: Total       AM-PAC Score:  Raw Score: 8   PT Approx Degree of Impairment Score: 86.62%   Standardized Score training;Neuromuscular re-educate;Range of motion;Strengthening;Transfer training;Balance training  Rehab Potential : Good  Frequency (Obs): Daily  Number of Visits to Meet Established Goals: 5      CURRENT GOALS    Goal #1 Assess bed mobility and transfer

## 2017-07-04 ENCOUNTER — APPOINTMENT (OUTPATIENT)
Dept: CT IMAGING | Facility: HOSPITAL | Age: 67
DRG: 023 | End: 2017-07-04
Attending: HOSPITALIST
Payer: MEDICARE

## 2017-07-04 LAB
FOLATE (FOLIC ACID), SERUM: 19.6 NG/ML (ref 8.7–24)
GLUCOSE BLD-MCNC: 107 MG/DL (ref 65–99)
GLUCOSE BLD-MCNC: 108 MG/DL (ref 65–99)
GLUCOSE BLD-MCNC: 109 MG/DL (ref 65–99)
GLUCOSE BLD-MCNC: 116 MG/DL (ref 65–99)
OSMOLALITY SERUM: 279 MOSM/KG (ref 280–300)
OSMOLALITY SERUM: 286 MOSM/KG (ref 280–300)
POTASSIUM SERPL-SCNC: 3.2 MMOL/L (ref 3.6–5.1)
POTASSIUM SERPL-SCNC: 3.6 MMOL/L (ref 3.6–5.1)
SODIUM SERPL-SCNC: 137 MMOL/L (ref 136–144)
SODIUM SERPL-SCNC: 138 MMOL/L (ref 136–144)
SODIUM SERPL-SCNC: 138 MMOL/L (ref 136–144)

## 2017-07-04 PROCEDURE — 99232 SBSQ HOSP IP/OBS MODERATE 35: CPT | Performed by: HOSPITALIST

## 2017-07-04 PROCEDURE — 99291 CRITICAL CARE FIRST HOUR: CPT | Performed by: OTHER

## 2017-07-04 PROCEDURE — 70450 CT HEAD/BRAIN W/O DYE: CPT | Performed by: HOSPITALIST

## 2017-07-04 PROCEDURE — 82962 GLUCOSE BLOOD TEST: CPT

## 2017-07-04 RX ORDER — POTASSIUM CHLORIDE 14.9 MG/ML
20 INJECTION INTRAVENOUS ONCE
Status: COMPLETED | OUTPATIENT
Start: 2017-07-04 | End: 2017-07-04

## 2017-07-04 NOTE — PROGRESS NOTES
[facility]  Interventional Neuroradiology Progress Note    Irma Grace Patient Status:  Inpatient    1950 MRN [id number]   [facility] 6NE-A Attending Sarah KangLOWELL [facility] Day # 3 PCP Khalif Redding       Subjective Nightly   • Senna  17.2 mg Oral Nightly   • docusate sodium  100 mg Oral BID   • LORazepam  1 mg Intravenous Once         Labs:    Lab Results  Component Value Date   K 3.2 07/04/2017   PGLU 116 07/04/2017       Imaging:  CTA Brain + Carotids 7/3/2017  CON to re-assess aneurysm  3. Ischemic order set in place  1. Ok to continue ASA 300mg CO until passes swallow  2. Atorvastatin 80 mg ordered, has not been receiving as he remains NPO at this time  3.  -140, able to wean off pressor this am  4.  PT/OT/ST

## 2017-07-04 NOTE — PLAN OF CARE
Impaired Swallowing    • Minimize aspiration risk Progressing        NEUROLOGICAL - ADULT    • Achieves stable or improved neurological status Progressing        Patient/Family Goals    • Patient/Family Short Term Goal Progressing        SAFETY ADULT - FAL

## 2017-07-04 NOTE — PROGRESS NOTES
[facility]  Progress Note     Tyson Younger Patient Status:  Inpatient    1950 MRN [id number]   [facility] 6NE-A Attending Sam[facility] Day # 3 PCP Eleazar Kerr     Chief Complaint: Acute stroke    S: Pa of 0.63 mg/dL). Recent Labs   Lab  07/01/17   1457  07/02/17   0400  07/03/17   0429   PTP  15.0*  15.7*  15.7*   INR  1.17*  1.24*  1.24*       No results for input(s): TROP, CK in the last 72 hours. Imaging: Imaging data reviewed in Epic.     Jamilah Duff Friday.

## 2017-07-04 NOTE — PROGRESS NOTES
[facility]    Neuro Critical care progress Note    Katlin Alpa Patient Status:  Inpatient    1950 MRN [id number]   [facility] 6NE-A [facility] PCP Timmy Huber     Subjective:    Sohail May ALKPHO  51   --    --    AST  36   --    --    ALT  28   --    --    BILT  1.3   --    --    TP  6.3   --    --          Assessment/plan  1.  Neuro- left MCA infarction due to left M2 occlusion and left carotid dissection s/p mechanical thrombectomy day 4

## 2017-07-05 ENCOUNTER — APPOINTMENT (OUTPATIENT)
Dept: CT IMAGING | Facility: HOSPITAL | Age: 67
DRG: 023 | End: 2017-07-05
Attending: Other
Payer: MEDICARE

## 2017-07-05 LAB
APTT PPP: 28.5 SECONDS (ref 25–34)
BASOPHILS # BLD AUTO: 0.03 X10(3) UL (ref 0–0.1)
BASOPHILS NFR BLD AUTO: 0.3 %
BUN BLD-MCNC: 11 MG/DL (ref 8–20)
CALCIUM BLD-MCNC: 8.3 MG/DL (ref 8.3–10.3)
CHLORIDE: 104 MMOL/L (ref 101–111)
CO2: 27 MMOL/L (ref 22–32)
CREAT BLD-MCNC: 0.54 MG/DL (ref 0.7–1.3)
EOSINOPHIL # BLD AUTO: 0.28 X10(3) UL (ref 0–0.3)
EOSINOPHIL NFR BLD AUTO: 2.9 %
ERYTHROCYTE [DISTWIDTH] IN BLOOD BY AUTOMATED COUNT: 17.8 % (ref 11.5–16)
GLUCOSE BLD-MCNC: 100 MG/DL (ref 70–99)
GLUCOSE BLD-MCNC: 113 MG/DL (ref 65–99)
GLUCOSE BLD-MCNC: 125 MG/DL (ref 65–99)
HAV IGM SER QL: 1.9 MG/DL (ref 1.7–3)
HCT VFR BLD AUTO: 32.4 % (ref 37–53)
HGB BLD-MCNC: 10.5 G/DL (ref 13–17)
IMMATURE GRANULOCYTE COUNT: 0.04 X10(3) UL (ref 0–1)
IMMATURE GRANULOCYTE RATIO %: 0.4 %
INR BLD: 1.13 (ref 0.89–1.11)
LYMPHOCYTES # BLD AUTO: 1.35 X10(3) UL (ref 0.9–4)
LYMPHOCYTES NFR BLD AUTO: 14.1 %
MCH RBC QN AUTO: 19.8 PG (ref 27–33.2)
MCHC RBC AUTO-ENTMCNC: 32.4 G/DL (ref 31–37)
MCV RBC AUTO: 61 FL (ref 80–99)
MONOCYTES # BLD AUTO: 0.8 X10(3) UL (ref 0.1–0.6)
MONOCYTES NFR BLD AUTO: 8.3 %
NEUTROPHIL ABS PRELIM: 7.1 X10 (3) UL (ref 1.3–6.7)
NEUTROPHILS # BLD AUTO: 7.1 X10(3) UL (ref 1.3–6.7)
NEUTROPHILS NFR BLD AUTO: 74 %
OSMOLALITY SERUM: 282 MOSM/KG (ref 280–300)
OSMOLALITY SERUM: 284 MOSM/KG (ref 280–300)
OSMOLALITY SERUM: 285 MOSM/KG (ref 280–300)
OSMOLALITY SERUM: 285 MOSM/KG (ref 280–300)
PHOSPHATE SERPL-MCNC: 2.9 MG/DL (ref 2.5–4.9)
PLATELET # BLD AUTO: 281 10(3)UL (ref 150–450)
POTASSIUM SERPL-SCNC: 3.5 MMOL/L (ref 3.6–5.1)
POTASSIUM SERPL-SCNC: 4.1 MMOL/L (ref 3.6–5.1)
PSA SERPL DL<=0.01 NG/ML-MCNC: 14.6 SECONDS (ref 12–14.3)
RBC # BLD AUTO: 5.31 X10(6)UL (ref 3.8–5.8)
RED CELL DISTRIBUTION WIDTH-SD: 34.5 FL (ref 35.1–46.3)
SODIUM SERPL-SCNC: 137 MMOL/L (ref 136–144)
SODIUM SERPL-SCNC: 138 MMOL/L (ref 136–144)
WBC # BLD AUTO: 9.6 X10(3) UL (ref 4–13)

## 2017-07-05 PROCEDURE — 99232 SBSQ HOSP IP/OBS MODERATE 35: CPT | Performed by: HOSPITALIST

## 2017-07-05 PROCEDURE — 80048 BASIC METABOLIC PNL TOTAL CA: CPT | Performed by: NURSE PRACTITIONER

## 2017-07-05 PROCEDURE — 85610 PROTHROMBIN TIME: CPT | Performed by: NURSE PRACTITIONER

## 2017-07-05 PROCEDURE — 82962 GLUCOSE BLOOD TEST: CPT

## 2017-07-05 PROCEDURE — 84295 ASSAY OF SERUM SODIUM: CPT | Performed by: OTHER

## 2017-07-05 PROCEDURE — 85730 THROMBOPLASTIN TIME PARTIAL: CPT | Performed by: NURSE PRACTITIONER

## 2017-07-05 PROCEDURE — 85025 COMPLETE CBC W/AUTO DIFF WBC: CPT | Performed by: NURSE PRACTITIONER

## 2017-07-05 PROCEDURE — 99291 CRITICAL CARE FIRST HOUR: CPT | Performed by: OTHER

## 2017-07-05 PROCEDURE — 84100 ASSAY OF PHOSPHORUS: CPT | Performed by: NURSE PRACTITIONER

## 2017-07-05 PROCEDURE — 70450 CT HEAD/BRAIN W/O DYE: CPT | Performed by: OTHER

## 2017-07-05 PROCEDURE — 83735 ASSAY OF MAGNESIUM: CPT | Performed by: NURSE PRACTITIONER

## 2017-07-05 PROCEDURE — 83930 ASSAY OF BLOOD OSMOLALITY: CPT | Performed by: OTHER

## 2017-07-05 RX ORDER — ARIPIPRAZOLE 15 MG/1
40 TABLET ORAL EVERY 4 HOURS
Status: COMPLETED | OUTPATIENT
Start: 2017-07-05 | End: 2017-07-05

## 2017-07-05 NOTE — PLAN OF CARE
Assumed care of pt approx 1910. Pt alert, follows simple commands. Aphasic but can say a few words clearly such as \"thank you. \"  Right arm flaccid, RLE weaker than LLE.   Agitated and restless at times, becomes easily frustrated when unable to communica

## 2017-07-05 NOTE — PROGRESS NOTES
[facility]  Progress Note     Eduardo Craig Patient Status:  Inpatient    1950 MRN [id number]   [facility] 6NE-A Attending Hema [facility] Day # 4 PCP Arpit Mayberry     Chief Complaint: Acute stroke    S: Pa 07/05/17   0510   PTP  15.7*  14.6*   INR  1.24*  1.13*       No results for input(s): TROP, CK in the last 72 hours. Imaging: Imaging data reviewed in Epic.     Medications:   • potassium chloride  40 mEq Per NG Tube Q4H   • mannitol 20% infusion

## 2017-07-05 NOTE — OCCUPATIONAL THERAPY NOTE
OCCUPATIONAL THERAPY TREATMENT NOTE - INPATIENT     Room Number: 7851/6716-T  Session: 1   Number of Visits to Meet Established Goals: 10    Presenting Problem: Acute Ischemic Stroke    History related to current admission: Pt admitted on 7/1/17 with JANET nichole bedpan or urinal? : Total  -   Putting on and taking off regular upper body clothing?: Total  -   Taking care of personal grooming such as brushing teeth?: Total  -   Eating meals?: Total    AM-PAC Score:  Score: 6  Approx Degree of Impairment: 100%  Stand training;Functional transfer training;UE strengthening/ROM; Endurance training;Cognitive reorientation;Patient/Family education;Patient/Family training;Neuromuscluar reeducation; Fine motor coordination activities; Compensatory technique education;Continued e

## 2017-07-05 NOTE — PROGRESS NOTES
[facility]  Neuro critical care progress Note    Katlin Yuen Patient Status:  Inpatient    1950 MRN [id number]   [facility] 6NE-A Attending Guilherme PonceLOWELL [facility] Day # 4  [address]     Subjective:    Chantale Basilio 60 mL Intravenous ONCE PRN   levETIRAcetam (KEPPRA) 250 mg in sodium chloride 0.9 % 100 mL IVPB 250 mg Intravenous Q12H   aspirin EC EC tab 325 mg 325 mg Oral Daily   Or      aspirin 300 MG rectal suppository 300 mg 300 mg Rectal Daily   Orphenadrine Jeremy Foot Min PRN   niCARdipine HCl in NaCl (CARDENE) 20 mg/200 ml premix infusion 5-15 mg/hr Intravenous Continuous PRN   phenylephrine in NaCl (SHANNA-SYNEPHRINE) 50 mg/250 ml premix infusion SOLN 100-200 mcg/min Intravenous Continuous PRN   atorvastatin (LIPITOR) ta --    --   100*   BUN  10   --    --    --    --   11   CREATSERUM  0.63*   --    --    --    --   0.54*   CA  7.9*   --    --    --    --   8.3   NA  141   --    < >  137  138  137  137   K  3.3*  3.3*  3.2*   --   3.6   --   3.5*   CL  107   --    -- 35 minutes to prevent neurologic instability. This involved direct patient intervention complex and decision-making and or extensive discussion with the patient/family and clinical status. (Exclusive of billlable procedures)           Lexii Ahmadi MD

## 2017-07-05 NOTE — PROGRESS NOTES
[facility]  Interventional Neuroradiology Progress Note    Azul ceci Patient Status:  Inpatient    1950 MRN [id number]   [facility] 6NE-A Attending Yecenia [facility] Day # 4 LEELA Ferreira President     CC:   Follow 281.0 07/05/2017   CREATSERUM 0.54 07/05/2017   BUN 11 07/05/2017    07/05/2017    07/05/2017   K 3.5 07/05/2017    07/05/2017   CO2 27.0 07/05/2017    07/05/2017   CA 8.3 07/05/2017   PTT 28.5 07/05/2017   INR 1.13 07/05/2017   PT and thrombectomy resulting in TICI 2b reperfusion, complicated by asymptomatic transient SAH/contrast on post-operative CT head study    Neurologically improved to NIHSS 15 --> 12 without current significant right sided hemineglect and gaze deviation, and

## 2017-07-05 NOTE — PLAN OF CARE
Assumed care of pt at 0730, alert, expressive/receptive aphasia noted, follows most commands, nods/gestures, speaks few words \"thank you, yes, no\", neuros q2, see flowsheet, room air, SR/SB on monitor, SBP goal 110-140, q6 hour accucheck, tube feeding at

## 2017-07-05 NOTE — PHYSICAL THERAPY NOTE
PHYSICAL THERAPY TREATMENT NOTE - INPATIENT    Room Number: 0069/2883-A     Session: 1    Number of Visits to Meet Established Goals: 5    Presenting Problem:  (acute CVA with possible SAH conversion)    Problem List  Principal Problem:    Acute ischemic CL    FUNCTIONAL ABILITY STATUS  Gait Assessment   Gait Assistance: Not tested           Stoop/Curb Assistance: Not tested  Comment : above per FIM    Skilled Therapy Provided: Pt received supine in bed and is agreeable to therapy.  Pt continues to be on be function prior to returning home. DISCHARGE RECOMMENDATIONS  PT Discharge Recommendations: Acute rehabilitation     PLAN  PT Treatment Plan: Bed mobility; Endurance; Patient education; Family education;Gait training;Neuromuscular re-educate;Range of mot

## 2017-07-06 ENCOUNTER — APPOINTMENT (OUTPATIENT)
Dept: GENERAL RADIOLOGY | Facility: HOSPITAL | Age: 67
DRG: 023 | End: 2017-07-06
Attending: HOSPITALIST
Payer: MEDICARE

## 2017-07-06 LAB
APTT PPP: 33.1 SECONDS (ref 25–34)
BASOPHILS # BLD AUTO: 0.02 X10(3) UL (ref 0–0.1)
BASOPHILS NFR BLD AUTO: 0.3 %
BUN BLD-MCNC: 11 MG/DL (ref 8–20)
CALCIUM BLD-MCNC: 8.5 MG/DL (ref 8.3–10.3)
CHLORIDE: 102 MMOL/L (ref 101–111)
CO2: 27 MMOL/L (ref 22–32)
CREAT BLD-MCNC: 0.61 MG/DL (ref 0.7–1.3)
EOSINOPHIL # BLD AUTO: 0.24 X10(3) UL (ref 0–0.3)
EOSINOPHIL NFR BLD AUTO: 3.2 %
ERYTHROCYTE [DISTWIDTH] IN BLOOD BY AUTOMATED COUNT: 18.1 % (ref 11.5–16)
GLUCOSE BLD-MCNC: 101 MG/DL (ref 70–99)
GLUCOSE BLD-MCNC: 107 MG/DL (ref 65–99)
GLUCOSE BLD-MCNC: 120 MG/DL (ref 65–99)
GLUCOSE BLD-MCNC: 137 MG/DL (ref 65–99)
GLUCOSE BLD-MCNC: 99 MG/DL (ref 65–99)
HAV IGM SER QL: 2 MG/DL (ref 1.7–3)
HCT VFR BLD AUTO: 32.3 % (ref 37–53)
HGB BLD-MCNC: 10.5 G/DL (ref 13–17)
IMMATURE GRANULOCYTE COUNT: 0.02 X10(3) UL (ref 0–1)
IMMATURE GRANULOCYTE RATIO %: 0.3 %
INR BLD: 1.12 (ref 0.89–1.11)
LYMPHOCYTES # BLD AUTO: 0.96 X10(3) UL (ref 0.9–4)
LYMPHOCYTES NFR BLD AUTO: 12.7 %
MCH RBC QN AUTO: 20.2 PG (ref 27–33.2)
MCHC RBC AUTO-ENTMCNC: 32.5 G/DL (ref 31–37)
MCV RBC AUTO: 62.2 FL (ref 80–99)
MONOCYTES # BLD AUTO: 0.64 X10(3) UL (ref 0.1–0.6)
MONOCYTES NFR BLD AUTO: 8.4 %
NEUTROPHIL ABS PRELIM: 5.7 X10 (3) UL (ref 1.3–6.7)
NEUTROPHILS # BLD AUTO: 5.7 X10(3) UL (ref 1.3–6.7)
NEUTROPHILS NFR BLD AUTO: 75.1 %
OSMOLALITY SERUM: 280 MOSM/KG (ref 280–300)
PHOSPHATE SERPL-MCNC: 3.3 MG/DL (ref 2.5–4.9)
PLATELET # BLD AUTO: 276 10(3)UL (ref 150–450)
POTASSIUM SERPL-SCNC: 3.6 MMOL/L (ref 3.6–5.1)
POTASSIUM SERPL-SCNC: 4.2 MMOL/L (ref 3.6–5.1)
PSA SERPL DL<=0.01 NG/ML-MCNC: 14.5 SECONDS (ref 12–14.3)
RBC # BLD AUTO: 5.19 X10(6)UL (ref 3.8–5.8)
RED CELL DISTRIBUTION WIDTH-SD: 35 FL (ref 35.1–46.3)
SODIUM SERPL-SCNC: 137 MMOL/L (ref 136–144)
SODIUM SERPL-SCNC: 137 MMOL/L (ref 136–144)
WBC # BLD AUTO: 7.6 X10(3) UL (ref 4–13)

## 2017-07-06 PROCEDURE — 99232 SBSQ HOSP IP/OBS MODERATE 35: CPT | Performed by: HOSPITALIST

## 2017-07-06 PROCEDURE — 82962 GLUCOSE BLOOD TEST: CPT

## 2017-07-06 PROCEDURE — 99233 SBSQ HOSP IP/OBS HIGH 50: CPT | Performed by: OTHER

## 2017-07-06 PROCEDURE — 74230 X-RAY XM SWLNG FUNCJ C+: CPT | Performed by: HOSPITALIST

## 2017-07-06 RX ORDER — LEVETIRACETAM 100 MG/ML
250 SOLUTION ORAL 2 TIMES DAILY
Status: DISCONTINUED | OUTPATIENT
Start: 2017-07-06 | End: 2017-07-11 | Stop reason: SDUPTHER

## 2017-07-06 RX ORDER — ARIPIPRAZOLE 15 MG/1
40 TABLET ORAL EVERY 4 HOURS
Status: COMPLETED | OUTPATIENT
Start: 2017-07-06 | End: 2017-07-06

## 2017-07-06 NOTE — PROGRESS NOTES
[facility]  Progress Note     Carlos Alberto Kaur Patient Status:  Inpatient    1950 MRN [id number]   [facility] 6NE-A Attending [place] [facility] Day # 5 PCP Zina Kawasaki     Chief Complaint: Acute stroke    S: Pa Tube Q4H   • mannitol 20% infusion  30 g Intravenous Q8H   • levETIRAcetam  250 mg Intravenous Q12H   • aspirin EC  325 mg Oral Daily    Or   • aspirin  300 mg Rectal Daily   • Heparin Sodium (Porcine)  5,000 Units Subcutaneous Q8H [address]   • famoTIDine  20 m

## 2017-07-06 NOTE — OCCUPATIONAL THERAPY NOTE
OCCUPATIONAL THERAPY TREATMENT NOTE - INPATIENT     Room Number: 7807/7600-W  Session: 2   Number of Visits to Meet Established Goals: 10    Presenting Problem: Acute Ischemic Stroke    History related to current admission: Pt admitted on 7/1/17 with R dayanara bedpan or urinal? : Total  -   Putting on and taking off regular upper body clothing?: A Lot  -   Taking care of personal grooming such as brushing teeth?: A Lot  -   Eating meals?: A Lot    AM-PAC Score:  Score: 9  Approx Degree of Impairment: 79.59%  Sta activities; Energy conservation/work simplification techniques;ADL training;IADL training;Functional transfer training;UE strengthening/ROM; Endurance training;Cognitive reorientation;Patient/Family education;Patient/Family training;Neuromuscluar reeducation

## 2017-07-06 NOTE — CM/SW NOTE
Spoke w/ wife and pt. They want Mj for rehab. Explained to pt. He was trying to ask questions but couldn't get it out. If he remembers he will ask. Ecin referral to ANGEL[facility]    Pt passed swallow.  Cont dobhoff for now per RN    Cristina Peterson RN/MADAI  NICU  [id number]/

## 2017-07-06 NOTE — CONSULTS
[facility]    Ashley Bingham Patient Status:  Inpatient    1950 MRN [id number]   [facility] 6NE-A Attending Marylou [address] [place] [address] [place] Day # 5 PCP Trace Fortune     Patient Identification  Ashley Bingham is a 77year old ma independent with mobility/ambulation, transfers, ADL's, IADL's.   Support System and Family Circumstances (i.e., potential caregivers): spouse / significant other  Home Environment / Accessibility: 2-story house  Current Functional Status: Minimal to maxima 650 MG rectal suppository 650 mg 650 mg Rectal Q4H PRN   haloperidol lactate (HALDOL) 5 MG/ML injection 1 mg 1 mg Intravenous Q4H PRN   [COMPLETED] iopamidol (ISOVUE-M) 76 % injection 100 mL 100 mL Intravenous ONCE PRN   [COMPLETED] niCARdipine HCl in NaCl Pneumococcal Vac Polyvalent (PNEUMOVAX) injection 0.5 mL 0.5 mL Subcutaneous Prior to discharge   fentaNYL citrate (SUBLIMAZE) 0.05 MG/ML injection 25 mcg 25 mcg Intravenous Q1H PRN   haloperidol lactate (HALDOL) 5 MG/ML injection 2 mg 2 mg Intravenous Q without obvious abnormality, atraumatic. Eyes:  Conjunctivae/lids clear. PERRL, EOMs intact. Vision functional.   Ears/Nose/Throat: Hearing intact. Lips, mucosa, and tongue normal. Teeth and gums normal. Moist mucous membranes.      Neck: No neck masses o for bleeding and hematoma or hemarthrosis (and aggravating anemia) with anticoagulation    PLAN:                   Based on patient's current functional status, pt would benefit from acute inpatient rehabilitation, working with PT/OT/SLP to upgrade present

## 2017-07-06 NOTE — PHYSICAL THERAPY NOTE
PHYSICAL THERAPY TREATMENT NOTE - INPATIENT    Room Number: 0866/4760-B     Session: 2    Number of Visits to Meet Established Goals: 5    Presenting Problem:  (acute CVA with possible SAH conversion)    Problem List  Principal Problem:    Acute ischemic Dependent assistance  Distance (ft): 6 (additional 40ft with walking pants)  Assistive Device:  (HHA)  Pattern: R Foot drag;R Foot flat;L Foot flat;R Flexed knee;R Foot drop  Stoop/Curb Assistance: Not tested  Comment : above per FIM    Skilled Therapy Pro further assessed once off bedrest.  The patient is below his baseline and would benefit from skilled inpatient PT to address the above deficits to assist patient in returning to prior level of function.  The patient scored a 5 on the Modified Webster Scale,

## 2017-07-06 NOTE — PROGRESS NOTES
[facility] Note: Route Optimization for Levetiracetam (KEPPRA)    Patient is currently on Levetiracetam (KEPPRA) 250 mg IV every 12 hours.    The patient meets the criteria to convert to the oral equivalent as established by the IV to PO conversion protoc

## 2017-07-06 NOTE — PROGRESS NOTES
[facility]  Neuro critical care progress Note           Kesha Cardenas Patient Status:  Inpatient    1950 MRN [id number]   [facility] 6NE-A Attending Aziza ByrdNIKITA [facility] Day # 5 PCP Mitchell Cifuentes      Subjective:    40 mEq in sodium chloride 0.9 % 250 mL IVPB 40 mEq Intravenous Once   [COMPLETED] iohexol (OMNIPAQUE) 350 MG/ML injection 60 mL 60 mL Intravenous ONCE PRN   levETIRAcetam (KEPPRA) 250 mg in sodium chloride 0.9 % 100 mL IVPB 250 mg Intravenous Q12H   aspiri injection      [] 0.9%  NaCl infusion  Intravenous Continuous   Labetalol HCl (TRANDATE) injection 10 mg 10 mg Intravenous Q10 Min PRN   niCARdipine HCl in NaCl (CARDENE) 20 mg/200 ml premix infusion 5-15 mg/hr Intravenous Continuous PRN   phenyleph --    --   101*   BUN  11   --    --   11   CREATSERUM  0.54*   --    --   0.61*   CA  8.3   --    --   8.5   NA  137  137  137  138  137  137   K  3.5*  4.1   --   3.6   CL  104   --    --   102   CO2  27.0   --    --   27.0              Assessment/plan

## 2017-07-06 NOTE — PLAN OF CARE
Patient alert, aphasia present-answering simple questions only. Recognizes family and interact appropriately. Follows most simple commands, inconsistently follows complex commands, even when prompted. He is able to gesture and make his needs known.  Unable

## 2017-07-06 NOTE — VIDEO SWALLOW STUDY NOTE
ADULT VIDEOFLUOROSCOPIC SWALLOWING STUDY    Admission Date: 7/1/2017  Evaluation Date: 07/06/17  Radiologist: Katina Bryant    Dear Dr. Bob Pearl,  This letter is to inform you of Sarbjit Wilkerson Videofluoroscopic Swallowing Study results and/or plan of treatment in conjunction with Radiologist.  Patient Positioned: Upright;[facility] chair. Patient Viewed: Lateral.  Patient Alertness: Lethargic; Constant cues requied to stay on task. Consistencies Presented: Thin liquids; Nectar thick liquids; Honey thick liquids;Puree; Stephane Jayna Severity, Location: Mild;Valleculae;Pyriform sinuses; Throughout pharynx  Cleared/Reduced with: Secondary swallow  Laryngeal Penetration:  (None)  Tracheal Aspiration:  (None)  Strategy(ies) Implemented (Puree):  Slow rate;Small bites and sips  Effectiveness meal, Eliminate distractions, Supervision with meals with min assistance 100 % of the time across 1-2 sessions.    Goal #4 Communication evaluation to be completed     EDUCATION/INSTRUCTION  Reviewed results and recommendations with patient/family/caregiver

## 2017-07-06 NOTE — PROGRESS NOTES
07/06/17 0925   Clinical Encounter Type   Visited With Patient and family together  (wife)   Continue Visiting Yes   Surgical Visit Post-op   Patient's Supportive Strategies/Resources Identified pt.'s strong family support includes his wife, children an

## 2017-07-06 NOTE — PLAN OF CARE
Assumed care of pt at 0730, alert, global aphasia but receptive aphasia improving, pt able to verbalize more each day, \"thank you, yes, no, I dont care, that's my wife\", right arm flaccid, see flowsheet for full neuro assessment, room air, lungs clear, S

## 2017-07-07 ENCOUNTER — APPOINTMENT (OUTPATIENT)
Dept: CT IMAGING | Facility: HOSPITAL | Age: 67
DRG: 023 | End: 2017-07-07
Attending: NURSE PRACTITIONER
Payer: MEDICARE

## 2017-07-07 LAB
APTT PPP: 36.6 SECONDS (ref 25–34)
BASOPHILS # BLD AUTO: 0.03 X10(3) UL (ref 0–0.1)
BASOPHILS NFR BLD AUTO: 0.4 %
BUN BLD-MCNC: 14 MG/DL (ref 8–20)
CALCIUM BLD-MCNC: 8.7 MG/DL (ref 8.3–10.3)
CHLORIDE: 100 MMOL/L (ref 101–111)
CO2: 24 MMOL/L (ref 22–32)
CREAT BLD-MCNC: 0.58 MG/DL (ref 0.7–1.3)
EOSINOPHIL # BLD AUTO: 0.26 X10(3) UL (ref 0–0.3)
EOSINOPHIL NFR BLD AUTO: 3.6 %
ERYTHROCYTE [DISTWIDTH] IN BLOOD BY AUTOMATED COUNT: 17.8 % (ref 11.5–16)
GLUCOSE BLD-MCNC: 116 MG/DL (ref 70–99)
HAV IGM SER QL: 2.1 MG/DL (ref 1.7–3)
HCT VFR BLD AUTO: 33.2 % (ref 37–53)
HGB BLD-MCNC: 10.8 G/DL (ref 13–17)
IMMATURE GRANULOCYTE COUNT: 0.03 X10(3) UL (ref 0–1)
IMMATURE GRANULOCYTE RATIO %: 0.4 %
INR BLD: 1.13 (ref 0.89–1.11)
LYMPHOCYTES # BLD AUTO: 1.32 X10(3) UL (ref 0.9–4)
LYMPHOCYTES NFR BLD AUTO: 18.3 %
MCH RBC QN AUTO: 19.9 PG (ref 27–33.2)
MCHC RBC AUTO-ENTMCNC: 32.5 G/DL (ref 31–37)
MCV RBC AUTO: 61.1 FL (ref 80–99)
MONOCYTES # BLD AUTO: 0.56 X10(3) UL (ref 0.1–0.6)
MONOCYTES NFR BLD AUTO: 7.7 %
NEUTROPHIL ABS PRELIM: 5.03 X10 (3) UL (ref 1.3–6.7)
NEUTROPHILS # BLD AUTO: 5.03 X10(3) UL (ref 1.3–6.7)
NEUTROPHILS NFR BLD AUTO: 69.6 %
PHOSPHATE SERPL-MCNC: 3.9 MG/DL (ref 2.5–4.9)
PLATELET # BLD AUTO: 313 10(3)UL (ref 150–450)
POTASSIUM SERPL-SCNC: 3.7 MMOL/L (ref 3.6–5.1)
PSA SERPL DL<=0.01 NG/ML-MCNC: 14.6 SECONDS (ref 12–14.3)
RBC # BLD AUTO: 5.43 X10(6)UL (ref 3.8–5.8)
RED CELL DISTRIBUTION WIDTH-SD: 34.5 FL (ref 35.1–46.3)
SODIUM SERPL-SCNC: 134 MMOL/L (ref 136–144)
WBC # BLD AUTO: 7.2 X10(3) UL (ref 4–13)

## 2017-07-07 PROCEDURE — 70496 CT ANGIOGRAPHY HEAD: CPT | Performed by: NURSE PRACTITIONER

## 2017-07-07 PROCEDURE — 99232 SBSQ HOSP IP/OBS MODERATE 35: CPT | Performed by: HOSPITALIST

## 2017-07-07 PROCEDURE — 70498 CT ANGIOGRAPHY NECK: CPT | Performed by: NURSE PRACTITIONER

## 2017-07-07 PROCEDURE — 99233 SBSQ HOSP IP/OBS HIGH 50: CPT | Performed by: OTHER

## 2017-07-07 RX ORDER — ARIPIPRAZOLE 15 MG/1
40 TABLET ORAL ONCE
Status: COMPLETED | OUTPATIENT
Start: 2017-07-07 | End: 2017-07-07

## 2017-07-07 NOTE — SLP NOTE
SPEECH/LANGUAGE/COGNITIVE EVALUATION - INPATIENT    Admission Date: 7/1/2017  Evaluation Date: 07/07/17    Reason for Referral: Stroke protocol;RN dysphagia screen    ASSESSMENT & PLAN   ASSESSMENT  Pt seen sitting upright in chair this PM for communicatio Impaired   Patient Experiencing Pain: No    PLAN FOR TREATMENT  Continued speech therapy services    GOALS  Goal #1 The patient will follow 1 step directions with max cues with 80 % accuracy within 3-4  session(s).    Goal #2 The patient will identify concr Date: 07/10/17    Thank you for your referral.  If you have any questions please contact Yvone Councilman, M.S. [address] [facility]  Pager [phone]

## 2017-07-07 NOTE — PLAN OF CARE
Problem: SLP ADULT - COMMUNICATION, IMPAIRED  Goal: By Discharge: Demonstrates communication skills at highest level of function for planned discharge setting. See evaluation for individualized goals.   Outcome: Progressing

## 2017-07-07 NOTE — PROGRESS NOTES
[facility]  Neuro critical care progress Note           Eduardo Craig Patient Status:  Inpatient    1950 MRN [id number]   [facility]NE-A [facility] Arpit Mayberry      Subjective:    Tube Q4H   [COMPLETED] potassium chloride 40 mEq in sodium chloride 0.9 % 250 mL IVPB 40 mEq Intravenous Once   Followed by      [COMPLETED] potassium chloride IVPB premix 20 mEq 20 mEq Intravenous Once   [COMPLETED] mannitol 20 % 64.3 g in 321.5 mL infusi (PF) 4 MG/ML injection 2 mg 2 mg Intravenous Q2H PRN   famoTIDine (PEPCID) tab 20 mg 20 mg Oral Daily   Or      famoTIDine (PEPCID) injection 20 mg 20 mg Intravenous Daily   [COMPLETED] Verapamil HCl (ISOPTIN) 2.5 MG/ML injection      [] 0.9%  NaCl 32.3*  33.2*   MCV  61.0*  62.2*  61.1*   MCH  19.8*  20.2*  19.9*   MCHC  32.4  32.5  32.5   RDW  17.8*  18.1*  17.8*   NEPRELIM  7.10*  5.70  5.03   WBC  9.6  7.6  7.2   PLT  281.0  276.0  313.0       Recent Labs   Lab  07/05/17   0510   07/05/17   1674 PT/OT  F- Family at bedside, all questions answered  G- No central line or smtyh     Proph:  - heparin SC for DVT prophylaxis  - pepcid     Dispo:   -Full code     Goal for the day: RIA NIX consultation. Possible transfer to floor today.     Thank you for al

## 2017-07-07 NOTE — PROGRESS NOTES
[facility]  Progress Note     Tessie Arias Patient Status:  Inpatient    1950 MRN [id number]   [facility] 6NE-A Attending George [facility] Day # 6 PCP Venus Lugo     Chief Complaint: Acute stroke    S: Pa 07/07/17   0554   PTP  14.6*  14.5*  14.6*   INR  1.13*  1.12*  1.13*       No results for input(s): TROP, CK in the last 72 hours. Imaging: Imaging data reviewed in Epic.     Medications:   • potassium chloride  40 mEq Per NG Tube Once   • levETIR working on acute rehab placement.

## 2017-07-07 NOTE — DIETARY NOTE
NUTRITION FOLLOW UP ASSESSMENT     Pt is at moderate nutrition risk.  Pt does not meet malnutrition criteria.     NUTRITION DIAGNOSIS/PROBLEM:     Inadequate oral intake related to inability to consume sufficient energy as evidenced by need for TF supplemen followed: n/a      FOOD/NUTRITION STATUS:  Appetite: Fair  Intake: >50 % of Meals   Intake Meeting Needs: Yes- with TF supplementation at night    ANTHROPOMETRICS:  Ht: 172.7 cm (5' 8\")  Wt: 62.5 kg (137.5 lb).  This is 89 % of IBW  BMI: Body mass index is

## 2017-07-07 NOTE — PHYSICAL THERAPY NOTE
PHYSICAL THERAPY TREATMENT NOTE - INPATIENT    Room Number: 3980/6997-N     Session: 3    Number of Visits to Meet Established Goals: 5    Presenting Problem:  (acute CVA with possible SAH conversion)    Problem List  Principal Problem:    Acute ischemic Assessment   Gait Assistance: Dependent assistance  Distance (ft): 20  Assistive Device: Other (Comment) (hemiwalker)  Pattern: R Foot drag  Stoop/Curb Assistance: Not tested  Comment : above per FIM    Skilled Therapy Provided: Pt received sitting up in b cognitive impairments, and limited communication.  These impairments manifest themselves as functional limitations in functional mobility skills to be further assessed once off bedrest.  The patient is below his baseline and would benefit from skilled inpat

## 2017-07-07 NOTE — CM/SW NOTE
Harrison Shannon from [facility] states they have pt \"pencilled\" in for a bed for tomorrow. If he is not medically cleared tomorrow, they may have to give that bed away. Discussed above with Jina Bay.     Roslyn Saab, 07/07/17, 2:35 PM

## 2017-07-07 NOTE — SLP NOTE
SPEECH DAILY NOTE - INPATIENT    Evaluation Date: 07/07/17    ASSESSMENT & PLAN   ASSESSMENT  Pt seen at bedside this PM for speech therapy services. Pt cooperative, pleasant, and alert. Per RN, pt tolerating diet well with no concerns of aspiration.  Pt's      FOLLOW UP  Follow Up Needed: Yes  SLP Follow-up Date: 07/10/17  Number of Visits to Meet Established Goals: 5  Session: 1/5    If you have any questions, please contact ANTONY Cates  Pager [phone]

## 2017-07-07 NOTE — PLAN OF CARE
Patient alert, answering simple questions with 1-2 word answers, having difficulty answering orientation questions. When asked yes/no orientation questions, he answers yes/no correctly. He recognizes family and friends and interacts appropriately.  Particip

## 2017-07-07 NOTE — PROGRESS NOTES
[facility]  Interventional Neuroradiology Progress Note    Gail Mckee Patient Status:  Inpatient    1950 MRN [id number]   [facility] 6NE-A Attending Lauren [facility] Day # 6 PCP Dayami Sylvester     CC:   Follow 3.7 07/07/2017    07/07/2017   CO2 24.0 07/07/2017    07/07/2017   CA 8.7 07/07/2017   PTT 36.6 07/07/2017   INR 1.13 07/07/2017   PTP 14.6 07/07/2017   MG 2.1 07/07/2017   PHOS 3.9 07/07/2017       Imaging:  CTA Brain + Carotids 7/7/2017  CON

## 2017-07-07 NOTE — OCCUPATIONAL THERAPY NOTE
OCCUPATIONAL THERAPY TREATMENT NOTE - INPATIENT     Room Number: 6133/9238-C  Session: 3  Number of Visits to Meet Established Goals: 10    Presenting Problem: Acute Ischemic Stroke    History related to current admission: Pt admitted on 7/1/17 with R side Toileting, which includes using toilet, bedpan or urinal? : A Lot  -   Putting on and taking off regular upper body clothing?: A Little  -   Taking care of personal grooming such as brushing teeth?: A Lot  -   Eating meals?: A Little    AM-PAC Score:  Scor impairments: activity tolerance, ability to follow directions, insight into deficits, R UE strength, R UE AROM, endurance, attention, alertness, awareness, and overall cognitive performance. D/t multiple impairments, pt is considered high complexity.  Recom

## 2017-07-08 LAB — POTASSIUM SERPL-SCNC: 3.8 MMOL/L (ref 3.6–5.1)

## 2017-07-08 PROCEDURE — 99232 SBSQ HOSP IP/OBS MODERATE 35: CPT | Performed by: HOSPITALIST

## 2017-07-08 PROCEDURE — 99233 SBSQ HOSP IP/OBS HIGH 50: CPT | Performed by: OTHER

## 2017-07-08 RX ORDER — POTASSIUM CHLORIDE 20 MEQ/1
40 TABLET, EXTENDED RELEASE ORAL ONCE
Status: COMPLETED | OUTPATIENT
Start: 2017-07-08 | End: 2017-07-08

## 2017-07-08 NOTE — DIETARY NOTE
Nutrition Short Note Calorie Count    Day 1 of Calorie count (Breakfast, lunch, dinner on 7/7) Pt consumed 3 meals of data. Pt received 744 kcals and 32 gms of protein, which meets approximately 40% of calorie and protein needs.  RD to follow up with Day 2

## 2017-07-08 NOTE — PROGRESS NOTES
[facility]  Progress Note     Allison Rochester Patient Status:  Inpatient    1950 MRN [id number]   [facility]NE-A [facility]     Chief Complaint: Acute stroke    S: Pa 1.12*  1.13*       No results for input(s): TROP, CK in the last 72 hours. Imaging: Imaging data reviewed in Epic.     Medications:   • levETIRAcetam  250 mg Per NG Tube BID   • aspirin EC  325 mg Oral Daily    Or   • aspirin  300 mg Rectal Daily

## 2017-07-08 NOTE — PLAN OF CARE
Resumed care at 0730. Alert and oriented x3-4 with difficultly and slurred talking. Wife at bedside. Telemetry monitor reading SR. PT today. Neuro q4, Am assessment unchanged. Fall precautions maintained per protocol.  Call light in reach at the chair/bedsi

## 2017-07-08 NOTE — OCCUPATIONAL THERAPY NOTE
OCCUPATIONAL THERAPY TREATMENT NOTE - INPATIENT     Room Number: 8312/7189-R  Session: 4/10  Number of Visits to Meet Established Goals: 10    Presenting Problem: Acute Ischemic Stroke    History related to current admission: Pt admitted on 7/1/17 with JANET lubin Toileting, which includes using toilet, bedpan or urinal? : A Lot  -   Putting on and taking off regular upper body clothing?: A Lot  -   Taking care of personal grooming such as brushing teeth?: A Lot  -   Eating meals?: A Little    AM-PAC Score:  Score Patient appears highly motivated to regain function and is recommended to have acute rehab at [place] to maximize independence and safety for ADLs and mobility.      OT Discharge Recommendations: Acute rehabilitation  OT Device Recommendations: None    PLAN  OT

## 2017-07-08 NOTE — PROGRESS NOTES
[id number] [facility] Progress Note    Fernando Guardado Patient Status:  Inpatient    1950 MRN [id number]   [facility] 7NE-A [facility] P[facility] KERA Rowe         Subjective:  Fernando Guardado the left MCA distribution consistent with evolving infarction. Stable midline shift to the right of approximately 3 mm with stable mass effect on the left cerebral hemisphere and left lateral ventricle when compared to CT scan performed earlier today. intake  2. Hypertension  3.  Hyperlipidemia       Neurologically stable, sitting up in the chair, accepted for acute rehab  Currently on asa 325 mg with decision when to start [facility] [place] per NI    Dr Stack Ou to follow       CARMELINA Reyes stimuli throughout   Coord: FNF intact with no tremor or dysmetria on L; unable to do on R  Romberg: deferred  Gait: deferred    Imaging:   Xr Video Swallow (cpt=[id number])    Result Date: 7/6/2017  PROCEDURE:  SWALLOWING STUDY  TECHNIQUE:  Video fluoroscopic to improve, still with aphasia and R hemiparesis, Arm > Leg but improving     Plan:   - A1C 5.0, LDL 66- continue Lipitor 80 mg daily   - maintain -140  - continue  mg daily for now; timing of anticoagulation as per NI - NI following   - cont

## 2017-07-08 NOTE — PROGRESS NOTES
[id number] [facility] Progress Note    Louisa Owens Patient Status:  Inpatient    1950 MRN [id number]   [facility] 7NE-A Attending Manish Champion[address] Day # 7 PCP KERA Robertson         Subjective:  Louisa Owens and basal ganglia in the left MCA distribution consistent with evolving infarction.      Stable midline shift to the right of approximately 3 mm with stable mass effect on the left cerebral hemisphere and left lateral ventricle when compared to CT scan perf intake  2. Hypertension  3.  Hyperlipidemia       Neurologically stable, sitting up in the chair, accepted for acute rehab  Currently on asa 325 mg with decision when to start [facility] per NI    Dr Coni Ortiz to follow       CARMELINA Dasilva

## 2017-07-09 LAB
GLUCOSE BLD-MCNC: 113 MG/DL (ref 65–99)
POTASSIUM SERPL-SCNC: 3.9 MMOL/L (ref 3.6–5.1)

## 2017-07-09 PROCEDURE — 99233 SBSQ HOSP IP/OBS HIGH 50: CPT | Performed by: OTHER

## 2017-07-09 PROCEDURE — 99232 SBSQ HOSP IP/OBS MODERATE 35: CPT | Performed by: HOSPITALIST

## 2017-07-09 PROCEDURE — 82962 GLUCOSE BLOOD TEST: CPT

## 2017-07-09 RX ORDER — WARFARIN SODIUM 5 MG/1
5 TABLET ORAL
Status: COMPLETED | OUTPATIENT
Start: 2017-07-09 | End: 2017-07-09

## 2017-07-09 RX ORDER — WARFARIN SODIUM 5 MG/1
5 TABLET ORAL NIGHTLY
Status: DISCONTINUED | OUTPATIENT
Start: 2017-07-09 | End: 2017-07-09

## 2017-07-09 NOTE — PROGRESS NOTES
[address] [facility] Progress Note    Ros Carbajal Patient Status:  Inpatient    1950 MRN [id number]   [facility] 7NE-A Attending Haley Aggarwali1101 [address] Day # 8 PCP KERA Castaneda         Subjective:  Ros Carbajal and basal ganglia in the left MCA distribution consistent with evolving infarction.     Stable midline shift to the right of approximately 3 mm with stable mass effect on the left cerebral hemisphere and left lateral ventricle when compared to CT scan perf intake  2. Hypertension  3.  Hyperlipidemia       Neurologically unchanged, accepted at [place] [address] for acute rehab  Waiting on NI approval for anticoagulation  Cont keppra for seizure prophylaxis   Cont asa and statin       Dr Meena De Leon to follow       Marianna Awad Coord: FNF intact with no tremor or dysmetria on L; unable to do on R  Romberg: deferred  Gait: deferred    Imaging:   Cta Brain+cta Carotids (contrast Only)(cpt=70496/99318)    Result Date: 7/7/2017  CONCLUSION:  1.  Compared to most recent CTA of the br

## 2017-07-09 NOTE — PROGRESS NOTES
[facility] HOSPITAL[facility]  Progress Note     Gail Mckee Patient Status:  Inpatient    1950 MRN [id number]   [facility] 6NE-A Attending [facility] Day # 8 PCP Dayami Sylvester     Chief Complaint: Acute stroke    S: Pa Subcutaneous Q8H [address]   • famoTIDine  20 mg Oral Daily    Or   • famoTIDine  20 mg Intravenous Daily   • atorvastatin  80 mg Oral Nightly   • LORazepam  1 mg Intravenous Once       ASSESSMENT / PLAN:     1. 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

## 2017-07-09 NOTE — DIETARY NOTE
Nutrition Short Note Calorie Count    Day 2 of Calorie count (Breakfast, lunch, dinner on 7/8). Two tickets saved but no intake recorded on ticket; therefore, RD unable to assess for kaden ct. RD to follow up with Day 3 kaden ct results on 7/10.     Amalia Wilkerson

## 2017-07-09 NOTE — PROGRESS NOTES
Stable course, aphasia and R hemiparesis. NCCT and CTA head 7/7/17 reviewed, no new lesion  D/w Dr Faith Caal, we are ok for patient to start anticoagulation from our point of view. Dr Mitali Zuniga notified.

## 2017-07-09 NOTE — CONSULTS
[facility] Dosing Service  Warfarin (Coumadin) Initial Dosing    Gail Mckee is a 77year old male for whom pharmacy has been consulted to dose warfarin (COUMADIN) for Carotid dissection  by Dr. Dawn Cadet. Based on this indication, goal INR is 2-3.

## 2017-07-09 NOTE — PHYSICAL THERAPY NOTE
PHYSICAL THERAPY TREATMENT NOTE - INPATIENT    Room Number: 7231/6158-K     Session: 4  Number of Visits to Meet Established Goals: 5    Presenting Problem: acute ischemic stroke    Problem List  Principal Problem:    Acute ischemic stroke ([address]  Active P (G-Code): CL    FUNCTIONAL ABILITY STATUS  Gait Assessment   Gait Assistance: Dependent assistance  Distance (ft): 20  Assistive Device: Other (Comment) Chacho-walker  Pattern: R Foot drag (R knee buckling, assist need for limb advancement)  Stoop/Curb [employer] yes/no answers throughout.  Given improvements and continued limitations in strength, muscle selectivity, and balance continue to recommend acute rehabilitation placement in order attempt to return towards prior level of function prior to returning home wit

## 2017-07-10 ENCOUNTER — TELEPHONE (OUTPATIENT)
Dept: SURGERY | Facility: CLINIC | Age: 67
End: 2017-07-10

## 2017-07-10 LAB
APTT PPP: 37.7 SECONDS (ref 25–34)
INR BLD: 1.06 (ref 0.89–1.11)
PSA SERPL DL<=0.01 NG/ML-MCNC: 13.8 SECONDS (ref 12–14.3)

## 2017-07-10 PROCEDURE — 99232 SBSQ HOSP IP/OBS MODERATE 35: CPT | Performed by: HOSPITALIST

## 2017-07-10 PROCEDURE — 99232 SBSQ HOSP IP/OBS MODERATE 35: CPT | Performed by: OTHER

## 2017-07-10 RX ORDER — WARFARIN SODIUM 5 MG/1
5 TABLET ORAL
Status: COMPLETED | OUTPATIENT
Start: 2017-07-10 | End: 2017-07-10

## 2017-07-10 NOTE — PHYSICAL THERAPY NOTE
PHYSICAL THERAPY TREATMENT NOTE - INPATIENT    Room Number: 4174/9802-A     Session: 5  Number of Visits to Meet Established Goals: 10 (additional 5 sessions added 7/10)    Presenting Problem: acute ischemic stroke    Problem List  Principal Problem:    A 36.74   CMS Modifier (G-Code): CL    FUNCTIONAL ABILITY STATUS  Gait Assessment   Gait Assistance: Dependent assistance  Distance (ft): 35 (20 + 15)  Assistive Device: Other (Comment) (chacho-walker) Chacho-walker  Pattern: R Foot drag;R Foot flat;Scissoring endurance, cognitive impairments. Pt continues to demonstrate impaired communication though responded appropriately with nodding and responding yes/no answers throughout. Continue to recommend AR upon hospital D/C in order to maximize return to PLOF.      D

## 2017-07-10 NOTE — PLAN OF CARE
Received report at 0700. Patient alert and oriented x3-4, but unable to answer direct questions. (i.e. Ask him is he at home, says no, is he in hospital, he says yes, etc.). NIH completed; not always able to follow directions. Has to do simple tasks.  Son a

## 2017-07-10 NOTE — SLP NOTE
SPEECH DAILY NOTE - INPATIENT    Evaluation Date: 07/10/17    ASSESSMENT & PLAN   ASSESSMENT  Pt seen this PM for speech therapy session. Pt cooperative, pleasant, and alert. Pt sitting up in chair. Pt's wife present during session.  Educated pt and pt's wi within 3-4 session(s).  NOT TARGETED   Goal #6 Educate pt/family/caregivers on results/recommendations and plan of care IN PROGRESS       FOLLOW UP  Follow Up Needed: Yes  SLP Follow-up Date: 07/12/17  Number of Visits to Meet Established Goals: 5  Session:

## 2017-07-10 NOTE — PROGRESS NOTES
[facility] HOSPITALIST  Progress Note     Jessica Montoya Patient Status:  Inpatient    1950 MRN [id number]   [facility] 6NE-A Attending Gunjan Hermosillo [facility] Day # 9 PCP Clarissa Patel     Chief Complaint: Acute stroke    S: Pa Once       ASSESSMENT / PLAN:     1. 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 S/p thrombectomy of LICA and M2 MCA by MATEO 7/1  2. keppra per neuro  3. Stroke protocol  4. PT/OT, ST - passed mech soft with honey thick  5.  Asa/statin, now on coumadin

## 2017-07-10 NOTE — PLAN OF CARE
9680  Son helped pt to use urinal. Son then went into bathroom to rinse the urinal. Pt attempted to get up and found on the floor. RN arrived to room, pt was lying on his right side. Neuro assessment performed; no new changes. Vitals stable.  Notified Dr. Rey Reddy

## 2017-07-10 NOTE — OCCUPATIONAL THERAPY NOTE
OCCUPATIONAL THERAPY TREATMENT NOTE - INPATIENT     Room Number: 1988/6461-Z  Session: 4/10  Number of Visits to Meet Established Goals: 10    Presenting Problem: Acute Ischemic Stroke    History related to current admission: Pt admitted on 7/1/17 with JANET lubin Toileting, which includes using toilet, bedpan or urinal? : A Little  -   Putting on and taking off regular upper body clothing?: A Lot  -   Taking care of personal grooming such as brushing teeth?: A Lot  -   Eating meals?: A Little    AM-PAC Score:  Scor increase functional independence. OT Discharge Recommendations: Acute rehabilitation  OT Device Recommendations: None    PLAN  OT Treatment Plan: Balance activities; Energy conservation/work simplification techniques;ADL training;IADL training;Functiona

## 2017-07-10 NOTE — PROGRESS NOTES
[address] [facility] Progress Note    Mandie Vieyra Patient Status:  Inpatient    1950 MRN [id number]   [facility] 7NE-A Attending Renaldo Churchillg[address] Day # 9 PCP Chaz Boyd     CC: Aphasia/Right sided weak Brain  There has been interval progression in extent of diffusion abnormality involving the left cerebral hemisphere and basal ganglia in the left MCA distribution consistent with evolving infarction.   Stable midline shift to the right of approximately 3mm mechanical thrombectomy: POD #8, Patient post left MCA CVA with successful mechanical thrombectomy, POD #8. Patient eating this AM, with ST to see. Has been accepted at [facility] and social work on case. Discussed with hospitalist PA and patient's son.  He is a

## 2017-07-10 NOTE — DIETARY NOTE
Nutrition Short Note: Calorie Count     Day 3,4 of Calorie count (Lunch, dinner on 7/9, Breakfast 7/10). Two tickets saved but no intake recorded on ticket; therefore, RD estimated calorie count per nurse I/O's % reports.  Discussed with nurse the need to r

## 2017-07-10 NOTE — PROGRESS NOTES
120 [facility] Dosing Service  Warfarin (Coumadin) Subsequent Dosing    Louisa Owens is a 77year old male for whom pharmacy has been dosing warfarin (Coumadin).  Goal INR is 2-3    Recent Labs   Lab  07/10/17   0445   INR  1.06     Potential Drug Interaction

## 2017-07-10 NOTE — CM/SW NOTE
RN told SW that pt may have possible dc for 7/11. RN stated that pt's dobhoff has been removed. SW called [facility] liaison [phone] regarding bed availability at [place] [address]. Bonnie Ayala stated that there will be a bed available for pt on 7/11.      GILMER/MADAI to remain av

## 2017-07-10 NOTE — PROGRESS NOTES
Patient ate 90% of breakfast this AM. Patient requesting to have dubhoff removed. Confirmed this was okay with CARLOS [place]. Continue to document calorie intake. Dubhoff removed. Patient tolerated well.

## 2017-07-11 LAB
BUN BLD-MCNC: 16 MG/DL (ref 8–20)
CALCIUM BLD-MCNC: 9 MG/DL (ref 8.3–10.3)
CHLORIDE: 100 MMOL/L (ref 101–111)
CO2: 32 MMOL/L (ref 22–32)
CREAT BLD-MCNC: 0.67 MG/DL (ref 0.7–1.3)
GLUCOSE BLD-MCNC: 91 MG/DL (ref 70–99)
HGB BLD-MCNC: 10.8 G/DL (ref 13–17)
INR BLD: 1.18 (ref 0.89–1.11)
POTASSIUM SERPL-SCNC: 3.8 MMOL/L (ref 3.6–5.1)
PSA SERPL DL<=0.01 NG/ML-MCNC: 15.1 SECONDS (ref 12–14.3)
SODIUM SERPL-SCNC: 137 MMOL/L (ref 136–144)

## 2017-07-11 PROCEDURE — 99232 SBSQ HOSP IP/OBS MODERATE 35: CPT | Performed by: OTHER

## 2017-07-11 PROCEDURE — 99232 SBSQ HOSP IP/OBS MODERATE 35: CPT | Performed by: HOSPITALIST

## 2017-07-11 RX ORDER — LEVETIRACETAM 250 MG/1
250 TABLET ORAL 2 TIMES DAILY
Status: DISCONTINUED | OUTPATIENT
Start: 2017-07-11 | End: 2017-07-14

## 2017-07-11 RX ORDER — WARFARIN SODIUM 7.5 MG/1
7.5 TABLET ORAL
Status: COMPLETED | OUTPATIENT
Start: 2017-07-11 | End: 2017-07-11

## 2017-07-11 NOTE — PLAN OF CARE
Assumed care at [address]. Pt is Alert with expressive aphasia. Follows some commands. NSR/ST on tele. Neuros Q4. Neuro assessment remains unchanged. VSS. Resting comfortably. Family at the bedside. Will continue to monitor.     Impaired Activities of Daily Earnstine Rosebud

## 2017-07-11 NOTE — PROGRESS NOTES
120 [facility] Dosing Service  Warfarin (Coumadin) Subsequent Dosing    Azul Warren is a 77year old male for whom pharmacy has been dosing warfarin (Coumadin).  Goal INR is 2-3    Recent Labs   Lab  07/10/17   0445  07/11/17   0418   INR  1.06  1.18*     Po

## 2017-07-11 NOTE — PHYSICAL THERAPY NOTE
PHYSICAL THERAPY TREATMENT NOTE - INPATIENT    Room Number: 3468/7509-U     Session: 6  Number of Visits to Meet Established Goals: 10 (additional 5 sessions added 7/10)    Presenting Problem: acute ischemic stroke    Problem List  Principal Problem:    A Scale): 36.74   CMS Modifier (G-Code): CL    FUNCTIONAL ABILITY STATUS  Gait Assessment   Gait Assistance: Maximum assistance (actual=Mod A x 2 and chair follow)  Distance (ft): 70 (15+20+35)  Assistive Device:  (chacho-walker) Chacho-walker  Pattern: R Foot d requires maximal cues for safely lowering to chair. Pt continues to present with R UE/LE strength deficits, decreased coordination, limited endurance, cognitive impairments.  Pt continues to demonstrate impaired communication though responded appropriately

## 2017-07-11 NOTE — PLAN OF CARE
Assumed care @ 0700. Patient seems to be AOx4, but has expressive aphasia. Neuros Q4. NIH-12  NSR/SB on tele. Vitals stable. Family at bedside. Will continue to monitor.     Impaired Activities of Daily Living    • Achieve highest/safest level of inde

## 2017-07-11 NOTE — PROGRESS NOTES
[address] [facility] Progress Note    Ashley Bingham Patient Status:  Inpatient    1950 MRN [id number]   [facility] 7NE-A Attending Marylou Paytone[address] Day [address]         Subjective:  Orly Perry midline shift.   Stable dissection of left ICA.     7/2/2017 MRI Brain  There has been interval progression in extent of diffusion abnormality involving the left cerebral hemisphere and basal ganglia in the left MCA distribution consistent with evolving inf Left MCA CVA due to left MCA occlusion and Left ICA dissection; underwent successful mechanical thrombectomy TICI 2b: POD #8  Full work up done as above,   Will continue medical treatment, await INR to 2 , then rehab         I have reviewed history and e

## 2017-07-11 NOTE — PLAN OF CARE
Problem: Impaired Swallowing  Goal: Minimize aspiration risk  Mech soft/honey  Pills whole  No straws  Upright to 90 degrees  Outcome: Progressing

## 2017-07-11 NOTE — PROGRESS NOTES
[facility]OSPITALIST  Progress Note     Sarbjit Wilkerson Patient Status:  Inpatient    1950 MRN [id number]   [facility] 6NE-A [facility]  [address]     Chief Complaint: Acute stroke    S: Patie 1. 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 S/p thrombectomy of LICA and M2 MCA by MATEO 7/1  2. keppra per neuro  3. Stroke protocol  4. PT/OT, ST - passed mech soft with honey thick  5. Asa/statin, now on coumadin with daily INR.   6. +aphasia

## 2017-07-11 NOTE — SLP NOTE
SPEECH DAILY NOTE - INPATIENT    Evaluation Date: 07/11/17    ASSESSMENT & PLAN   ASSESSMENT  Pt seen this PM for speech therapy session. Pt cooperative, pleasant, and alert. Pt sitting up in chair.  Pt's son present for part of the session, then kindly ask max cues with 80 % accuracy within 3-4 session(s). IN PROGRESS   Goal #3 The patient will answer [place], Abstract yes/no questions with 80 % accuracy within max 3-4 session(s).  IN PROGRESS   Goal #4 The patient will recite Serialized Sentences/Sing Songs

## 2017-07-11 NOTE — TELEPHONE ENCOUNTER
Offered date & time, per [place] pt will need to be seen sooner, she will discuss w/Dr. Kramer [place]; meanwhile pt is scheduled for 08/16 @ 11am unless otherwise informed by Dr. Kramer [place]

## 2017-07-11 NOTE — PROGRESS NOTES
SUBJECTIVE:  Difficulty with verbal output and with comprehension. He is right arm more than leg weakness. Still weak in the distal right leg.   CC:   Difficulty walking and speaking  Interval History:  He has left MCA occlusion and left ICA dissection st Intake/Output:  {I/O choices:213Physical Therapy minimal to moderate assist with ability transfers and gait 35 feet. Minimal to moderate assist for ADL    LUNG: normal Breath Sounds, no wheezes, no rhonchi, no tachypnea.   CARDIOVASCULAR: Regular r

## 2017-07-11 NOTE — DIETARY NOTE
Nutrition Short Note: Calorie Count    No data recorded for:   7/10 - Lunch   7/10 - Dinner   7/11 - [address]  Dietetic Intern

## 2017-07-11 NOTE — OCCUPATIONAL THERAPY NOTE
OCCUPATIONAL THERAPY TREATMENT NOTE - INPATIENT     Room Number: 5761/3810-D  Session: 5  Number of Visits to Meet Established Goals: 10    Presenting Problem: Acute Ischemic Stroke    History related to current admission: Pt admitted on 7/1/17 with R side using toilet, bedpan or urinal? : A Lot  -   Putting on and taking off regular upper body clothing?: A Lot  -   Taking care of personal grooming such as brushing teeth?: A Lot  -   Eating meals?: A Little    AM-PAC Score:  Score: 12  Approx Degree of Impai with ADL/functional transfers. Acute rehab is recommended due to significant decline from prior level of function, high level of motivation, ability to benefit from intensive therapy, and potential to benefit from daily physiatry.        OT Discharge Recomm

## 2017-07-12 LAB
INR BLD: 1.48 (ref 0.89–1.11)
PSA SERPL DL<=0.01 NG/ML-MCNC: 18.1 SECONDS (ref 12–14.3)

## 2017-07-12 PROCEDURE — 99232 SBSQ HOSP IP/OBS MODERATE 35: CPT | Performed by: OTHER

## 2017-07-12 PROCEDURE — 99232 SBSQ HOSP IP/OBS MODERATE 35: CPT | Performed by: HOSPITALIST

## 2017-07-12 RX ORDER — WARFARIN SODIUM 7.5 MG/1
7.5 TABLET ORAL
Status: COMPLETED | OUTPATIENT
Start: 2017-07-12 | End: 2017-07-12

## 2017-07-12 NOTE — PLAN OF CARE
Assumed care @0700. Pt appears AOx4 but has expressive aphasia. Neuro exams Q4h. See assessment. Seizure/aspiration precautions maintained. No seizure activity noted. Pt and wife updated on POC. Oriented to room and call light.    Will continue to

## 2017-07-12 NOTE — PROGRESS NOTES
[id number] [facility] Progress Note    Mandie Vieyra Patient Status:  Inpatient    1950 MRN [id number]   [facility] 7NE-A Attending Shad Valenzuela MD   [address] Day # 11 PCP Alem Medrano         Subjective:  Mandie Vieyra is a most recent CTA of the brain and neck, slight interval decrease in the amount of left SAH.  Slight improvement of rightward midline shift.   Stable dissection of left ICA.     7/2/2017 MRI Brain  There has been interval progression in extent of diffusion ab follow up on 8/16, 11am.  Obtain CTA Head/Neck during week of 7/30 to assess dissection  10. Neurology follow up on 8/16, 2pm  2. HTN    Slowly improving.  on coumadin with INR 1.48 today, waiting for INR > 2 then will stop asa and patient can be dc to [facility]

## 2017-07-12 NOTE — OCCUPATIONAL THERAPY NOTE
OCCUPATIONAL THERAPY TREATMENT NOTE - INPATIENT     Room Number: 8856/7113-T  Session: 6  Number of Visits to Meet Established Goals: 10    Presenting Problem: Acute Ischemic Stroke    History related to current admission: Pt admitted on 7/1/17 with R side rinsing, drying)?: A Lot  -   Toileting, which includes using toilet, bedpan or urinal? : A Lot  -   Putting on and taking off regular upper body clothing?: A Lot  -   Taking care of personal grooming such as brushing teeth?: A Lot  -   Eating meals?: ALEXEY Perkins with repetition of instructions for processing. increased ability to use L hand functionally however needs more improvement. Pt displays efficient attention to detail with activity when increased time in provided for scanning and processing.  Visual attent Program Goal  Patient will be minimum assistance with right PROM HEP (home exercise program). --Met 7/11, upgrade to supervision     Additional Goals:  Pt will follow 40% of commands during functional activity.--MET 7/8

## 2017-07-12 NOTE — PHYSICAL THERAPY NOTE
PHYSICAL THERAPY TREATMENT NOTE - INPATIENT    Room Number: 7536/7892-J     Session: 7  Number of Visits to Meet Established Goals: 10 (additional 5 sessions added 7/10)    Presenting Problem: acute ischemic stroke    Problem List  Principal Problem:    A Modifier (G-Code): CL    FUNCTIONAL ABILITY STATUS  Gait Assessment   Gait Assistance: Maximum assistance (per FIM distance - otherwise)  Distance (ft): 50-40-50  Assistive Device:  (kaycee-walker)   Pattern: R Foot drag;R Foot flat;Scissoring  Stoop/Curb As RECOMMENDATIONS  PT Discharge Recommendations: Acute rehabilitation     PLAN  PT Treatment Plan: Bed mobility; Endurance; Patient education; Family education;Gait training;Neuromuscular re-educate;Range of motion;Strengthening;Transfer training;Balance traini

## 2017-07-12 NOTE — PROGRESS NOTES
[facility]PITAL[facility]  Progress Note     Mandie Vieyra Patient Status:  Inpatient    1950 MRN [id number]   [facility] 6NE-A Attending Juany Beasley MD   Hosp Day # 6 PCP Alem Medrano     Chief Complaint: Acute stroke    S: Patient w famoTIDine  20 mg Oral Daily    Or   • famoTIDine  20 mg Intravenous Daily   • atorvastatin  80 mg Oral Nightly   • LORazepam  1 mg Intravenous Once       ASSESSMENT / PLAN:     1. 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  S/p thrombectomy of LICA

## 2017-07-12 NOTE — PLAN OF CARE
Assumed care at [address]. Assumed care at [address]. Pt is Alert with expressive aphasia. Follows some commands. NSR/SB on tele. Neuros Q4. Neuro assessment remains unchanged. VSS. INR this AM 1.48. Resting comfortably. Family at the bedside.  Will continue to mo

## 2017-07-13 LAB
INR BLD: 2.14 (ref 0.89–1.11)
PSA SERPL DL<=0.01 NG/ML-MCNC: 24.3 SECONDS (ref 12–14.3)

## 2017-07-13 PROCEDURE — 99232 SBSQ HOSP IP/OBS MODERATE 35: CPT | Performed by: OTHER

## 2017-07-13 PROCEDURE — 99232 SBSQ HOSP IP/OBS MODERATE 35: CPT | Performed by: HOSPITALIST

## 2017-07-13 RX ORDER — WARFARIN SODIUM 6 MG/1
6 TABLET ORAL DAILY
Qty: 30 TABLET | Refills: 0 | Status: SHIPPED | OUTPATIENT
Start: 2017-07-13 | End: 2017-08-16 | Stop reason: DRUGHIGH

## 2017-07-13 RX ORDER — LEVETIRACETAM 250 MG/1
250 TABLET ORAL 2 TIMES DAILY
Qty: 60 TABLET | Refills: 1 | Status: SHIPPED | OUTPATIENT
Start: 2017-07-13 | End: 2017-08-16

## 2017-07-13 RX ORDER — ATORVASTATIN CALCIUM 80 MG/1
80 TABLET, FILM COATED ORAL NIGHTLY
Qty: 30 TABLET | Refills: 1 | Status: SHIPPED | OUTPATIENT
Start: 2017-07-13 | End: 2017-07-14

## 2017-07-13 NOTE — PROGRESS NOTES
Patient still feeling well, but with persistent right arm flaccidity and 4/5 right leg strength. Still aphasic.  exam unchanged from yesterday; vitals stable. Patient cleared by d/c today medically. Med rec to follow.     Roshni Vila MD  [facility]

## 2017-07-13 NOTE — PLAN OF CARE
Assumed care at [address] [place] [address]. Pt A/O with expressive aphasia. He is able to follow some commands. NSR/SB on tele. Neuros Q4. Neuro assessment remains unchanged. (See flow sheets). VSS. INR this AM 2. 14. Resting comfortably. Will continue to moniotor.     Impair

## 2017-07-13 NOTE — OCCUPATIONAL THERAPY NOTE
OCCUPATIONAL THERAPY TREATMENT NOTE - INPATIENT     Room Number: 5743/0960-T  Session: 7  Number of Visits to Meet Established Goals: 10    Presenting Problem: Acute Ischemic Stroke    History related to current admission: Pt admitted on 7/1/17 with R side rinsing, drying)?: A Lot  -   Toileting, which includes using toilet, bedpan or urinal? : A Lot  -   Putting on and taking off regular upper body clothing?: A Lot  -   Taking care of personal grooming such as brushing teeth?: A Little  -   Eating meals?: A since they have a previous high PLOF, they are motivated, can handle intensive therapy, are medically complex, and require daily physiatrity.       OT Discharge Recommendations: Acute rehabilitation  OT Device Recommendations: None    PLAN  OT Treatment Khair

## 2017-07-13 NOTE — PLAN OF CARE
Assumed care @0700. VSS on RA. Pt seems AOx4 but has expressive aphasia. Neuro exams Q4h. See assessment. No complaints of pain. All specialties signed off. Stable for D/C. No bed avaliable today at [facility]. Will D/C tomorrow.    Oriented to room and kaden

## 2017-07-13 NOTE — PROGRESS NOTES
Pharmacy is signing off warfarin (Coumadin) dosing pursuant to orders for warfarin (Coumadin) today from [facility] Tiwari, [address]. If pharmacy should resume dosing this warfarin (Coumadin), please order another consult stating such.     Thank you for the opportu

## 2017-07-13 NOTE — DISCHARGE SUMMARY
[facility] [place] HOSPITALIST  DISCHARGE SUMMARY     Shania Meyers Patient Status:  Inpatient    1950 MRN [id number]   [facility] 7NE-A Attending Jv Gurrola MD   [facility] Day # 15 PCP Roshni Forte     Date of Admission: 2017  Date of Dis TPA.  CT the brain showed left MCA CVA, CTA confirmed findings. MRI of the brain showed CVA again and also occlusion of the left internal carotid artery. He was sent to [facility] for further neuro intervention.   He was noted to have LICA cervical d tablet  Refills:  1     levETIRAcetam 250 MG Tabs  Commonly known as:  KEPPRA      Take 1 tablet (250 mg total) by mouth 2 (two) times daily.    Quantity:  60 tablet  Refills:  1     Warfarin Sodium 6 MG Tabs  Commonly known as:  COUMADIN      Take 1 tablet weaker the LLE however improving. +aphasic  Extremities: No edema.   -----------------------------------------------------------------------------------------------  PATIENT DISCHARGE INSTRUCTIONS: See electronic chart    CARLOS Navarrete 7/14/2017    T

## 2017-07-13 NOTE — PROGRESS NOTES
[address] [facility] Progress Note    Minh Vogel Patient Status:  Inpatient    1950 MRN [id number]   [facility] 7NE-A Attending Benita Ochoa MD   [address] Day # 12 PCP Yan Damon         Subjective:  Minh Vogel is a Imagin2017 CTA Brain and Carotids  Compared to most recent CTA of the brain and neck, slight interval decrease in the amount of left SAH.  Slight improvement of rightward midline shift.   Stable dissection of left ICA.     2017 MRI Br imaging  9. MATEO follow up on 8/16, 11am.  Obtain CTA Head/Neck during week of 7/30 to assess dissection  10. Neurology follow up on 8/16, 2pm  2.  HTN      INR therapeutic today at 2.14, will stop aspirin   Cont keppra 250 mg BID and statin for now    [address] Miley mc

## 2017-07-13 NOTE — CM/SW NOTE
Feliz Herron, 07/13/17, 1:58 PM  No beds available at [facility].  Beds to open up tomorrow, 7/13.

## 2017-07-13 NOTE — SLP NOTE
SPEECH DAILY NOTE - INPATIENT    Evaluation Date: 07/13/17    ASSESSMENT & PLAN   ASSESSMENT  Patient seen for both aphasia therapy and dysphagia. He was awake and alert, sitting in chair upon arrival.  Wife present.     Communication: Continued significan aspiration   AUDITORY COMPREHENSION Egocentric yes/no with use of pointing to \"yes\"/\"no\" words: 70%  Simple yes/no questions: 40%  Follow 1-step directives: 40%  ID pictures field of 3: 67%  ID pictures field of 6: 40%   VERBAL EXPRESSION Automatic seq

## 2017-07-13 NOTE — PROGRESS NOTES
[facility] HOSPITAL[facility]  Progress Note     Alise Joshua Patient Status:  Inpatient    1950 MRN [id number]   [facility] 6NE-A Attending Sonali Tarango MD   Hosp Day # 15 PCP May Turner     Chief Complaint: Acute stroke    S: Patient w Oral Daily    Or   • famoTIDine  20 mg Intravenous Daily   • atorvastatin  80 mg Oral Nightly   • LORazepam  1 mg Intravenous Once       ASSESSMENT / PLAN:     1. 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  S/p thrombectomy of LICA and M2 MCA by MATEO [phone]

## 2017-07-13 NOTE — PHYSICAL THERAPY NOTE
PHYSICAL THERAPY TREATMENT NOTE - INPATIENT    Room Number: 1640/6744-O     Session: 8  Number of Visits to Meet Established Goals: 10 (additional 5 sessions added 7/10)    Presenting Problem: acute ischemic stroke    Problem List  Principal Problem:    A Modifier (G-Code): CL    FUNCTIONAL ABILITY STATUS  Gait Assessment   Gait Assistance: Maximum assistance (per FIM distance - otherwise Mod-Max)  Distance (ft): 70 x 2 (seated rest break)  Assistive Device:  (kaycee-walker)   Pattern: R Foot drag;R Foot flat Pt. presents with decreased balance, impaired strength, difficulty with gait/transfers resulting in downgrade of overall functional mobility.   Due to above deficits, Pt will benefit from continued IP PT, so that patient may achieve highest functional indep

## 2017-07-14 VITALS
HEART RATE: 84 BPM | RESPIRATION RATE: 18 BRPM | WEIGHT: 137.81 LBS | HEIGHT: 68 IN | TEMPERATURE: 98 F | OXYGEN SATURATION: 97 % | DIASTOLIC BLOOD PRESSURE: 88 MMHG | SYSTOLIC BLOOD PRESSURE: 121 MMHG | BODY MASS INDEX: 20.89 KG/M2

## 2017-07-14 LAB
INR BLD: 2.31 (ref 0.89–1.11)
POTASSIUM SERPL-SCNC: 3.7 MMOL/L (ref 3.6–5.1)
PSA SERPL DL<=0.01 NG/ML-MCNC: 25.8 SECONDS (ref 12–14.3)

## 2017-07-14 PROCEDURE — 99238 HOSP IP/OBS DSCHRG MGMT 30/<: CPT | Performed by: HOSPITALIST

## 2017-07-14 RX ORDER — POTASSIUM CHLORIDE 20 MEQ/1
40 TABLET, EXTENDED RELEASE ORAL ONCE
Status: COMPLETED | OUTPATIENT
Start: 2017-07-14 | End: 2017-07-14

## 2017-07-14 RX ORDER — ATORVASTATIN CALCIUM 80 MG/1
80 TABLET, FILM COATED ORAL NIGHTLY
Qty: 30 TABLET | Refills: 1 | Status: SHIPPED | OUTPATIENT
Start: 2017-07-14 | End: 2017-10-13

## 2017-07-14 NOTE — PHYSICAL THERAPY NOTE
PHYSICAL THERAPY TREATMENT NOTE - INPATIENT    Room Number: 8812/7485-W     Session: 9  Number of Visits to Meet Established Goals: 10 (additional 5 sessions added 7/10)    Presenting Problem: acute ischemic stroke    Problem List  Principal Problem:    A Score (AM-PAC Scale): 38.1   CMS Modifier (G-Code): CL    FUNCTIONAL ABILITY STATUS  Gait Assessment   Gait Assistance: Maximum assistance (per FIM distance - otherwise Mod-Max)  Distance (ft): 70 x 2 (seated rest break)  Assistive Device:  (Chacho Walker) patient is 61.29% degree of basic mobility impairment. Research supports that patients with this level of impairment often benefit from [address].     At this time, Pt. presents with decreased balance, impaired strength, difficulty with gait/transfers resulting i

## 2017-07-14 NOTE — PLAN OF CARE
Assumed care of pt at 1900. Alert, has aphasia. Will follow some commands. Neuro checks q4h, no changes. Denies any pain. Up and transfers to the chair with assist x1. Needs attended to over night. Plan for d/c to [facility] today.     Impaired Activities of Daily

## 2017-07-14 NOTE — PROGRESS NOTES
Unchanged exam; patient feels fine. Vitals stable.   Medically cleared for d/c today; awaiting bed to open up at 1900 S Geo Montoya MD  [facility]  Internal Medicine Hospitalist  Pager [phone]

## 2017-07-14 NOTE — CM/SW NOTE
07/14/17 1400   Discharge disposition   Discharged to: [address] [place] [address]   Name of Facillity/Home Care/Hospice [facility]   Patient is Discharged to a [address] [place] [address] Yes   Discharge transportation [employer]

## 2017-07-14 NOTE — CM/SW NOTE
Peyton Batista, 07/14/17, 11:31 AM  [facility] ambulance ordered for 2:00 pm to take to . Left message on patient's wife and patient's son voice mail at the numbers listed ont he face sheet. Informed Jeremy Jones RN.

## 2017-07-14 NOTE — PROGRESS NOTES
NURSING DISCHARGE NOTE    Discharged Rehab facility via Ambulance. Accompanied by Family member and Support staff  Belongings Taken by patient/family. Pt discharge to [facility] per MD order. RN report given over the phone.   Discharge instructions, medi

## 2017-07-14 NOTE — PLAN OF CARE
Assumed care @ 0700. Pt a/o x4, VSS. Tele SR. No acute respiratory distress noted. Denies any pain. Pt ambulated with PT with walker x 2 assist.  (-)BM, (+)BS. Will be discharged to  this afternoon. No other complaints made.   Will continue to [facility]

## 2017-07-14 NOTE — OCCUPATIONAL THERAPY NOTE
OCCUPATIONAL THERAPY TREATMENT NOTE - INPATIENT     Room Number: 4642/7873-N  Session: 8  Number of Visits to Meet Established Goals: 10    Presenting Problem: Acute Ischemic Stroke    History related to current admission: Pt admitted on 7/1/17 with R side rinsing, drying)?: A Lot  -   Toileting, which includes using toilet, bedpan or urinal? : A Lot  -   Putting on and taking off regular upper body clothing?: A Lot  -   Taking care of personal grooming such as brushing teeth?: A Little  -   Eating meals?: A rehabilitation  OT Device Recommendations: None    PLAN  OT Treatment Plan: Balance activities; Energy conservation/work simplification techniques;ADL training;IADL training;Functional transfer training;UE strengthening/ROM; Endurance training;Cognitive reor

## 2017-07-20 ENCOUNTER — HOSPITAL ENCOUNTER (OUTPATIENT)
Dept: CT IMAGING | Facility: HOSPITAL | Age: 67
Discharge: HOME | End: 2017-07-20
Attending: PHYSICIAN ASSISTANT
Payer: MEDICARE

## 2017-07-20 DIAGNOSIS — I63.9 ACUTE ISCHEMIC STROKE (HCC): ICD-10-CM

## 2017-07-20 DIAGNOSIS — I60.9 SUBARACHNOID BLEED (HCC): ICD-10-CM

## 2017-07-20 DIAGNOSIS — I77.71 DISSECTION OF CAROTID ARTERY (HCC): ICD-10-CM

## 2017-07-20 PROCEDURE — 70496 CT ANGIOGRAPHY HEAD: CPT | Performed by: PHYSICIAN ASSISTANT

## 2017-07-20 PROCEDURE — 70498 CT ANGIOGRAPHY NECK: CPT | Performed by: PHYSICIAN ASSISTANT

## 2017-07-31 ENCOUNTER — SNF/IP PROF CHARGE ONLY (OUTPATIENT)
Dept: INTERNAL MEDICINE CLINIC | Facility: CLINIC | Age: 67
End: 2017-07-31

## 2017-07-31 DIAGNOSIS — I60.9 SUBARACHNOID BLEED (HCC): ICD-10-CM

## 2017-07-31 DIAGNOSIS — I63.9 ACUTE ISCHEMIC STROKE (HCC): ICD-10-CM

## 2017-07-31 DIAGNOSIS — R26.2 DIFFICULTY IN WALKING: ICD-10-CM

## 2017-07-31 DIAGNOSIS — R53.1 RIGHT SIDED WEAKNESS: ICD-10-CM

## 2017-07-31 PROCEDURE — 99305 1ST NF CARE MODERATE MDM 35: CPT | Performed by: INTERNAL MEDICINE

## 2017-08-03 PROCEDURE — 99308 SBSQ NF CARE LOW MDM 20: CPT | Performed by: INTERNAL MEDICINE

## 2017-08-07 PROCEDURE — 99308 SBSQ NF CARE LOW MDM 20: CPT | Performed by: INTERNAL MEDICINE

## 2017-08-09 PROCEDURE — 99308 SBSQ NF CARE LOW MDM 20: CPT | Performed by: INTERNAL MEDICINE

## 2017-08-16 ENCOUNTER — OFFICE VISIT (OUTPATIENT)
Dept: NEUROLOGY | Facility: CLINIC | Age: 67
End: 2017-08-16

## 2017-08-16 ENCOUNTER — OFFICE VISIT (OUTPATIENT)
Dept: SURGERY | Facility: CLINIC | Age: 67
End: 2017-08-16

## 2017-08-16 VITALS — DIASTOLIC BLOOD PRESSURE: 80 MMHG | HEART RATE: 68 BPM | SYSTOLIC BLOOD PRESSURE: 130 MMHG

## 2017-08-16 DIAGNOSIS — I60.9 SUBARACHNOID BLEED (HCC): ICD-10-CM

## 2017-08-16 DIAGNOSIS — I77.71 DISSECTION OF CAROTID ARTERY (HCC): ICD-10-CM

## 2017-08-16 DIAGNOSIS — I63.512 CEREBROVASCULAR ACCIDENT (CVA) DUE TO OCCLUSION OF LEFT MIDDLE CEREBRAL ARTERY (HCC): Primary | ICD-10-CM

## 2017-08-16 PROBLEM — G93.6 CEREBRAL EDEMA (HCC): Status: RESOLVED | Noted: 2017-07-03 | Resolved: 2017-08-16

## 2017-08-16 PROCEDURE — 99214 OFFICE O/P EST MOD 30 MIN: CPT

## 2017-08-16 PROCEDURE — 99214 OFFICE O/P EST MOD 30 MIN: CPT | Performed by: OTHER

## 2017-08-16 PROCEDURE — 99308 SBSQ NF CARE LOW MDM 20: CPT | Performed by: INTERNAL MEDICINE

## 2017-08-16 RX ORDER — MELATONIN
325
COMMUNITY
End: 2017-10-12

## 2017-08-16 RX ORDER — WARFARIN SODIUM 3 MG/1
3 TABLET ORAL DAILY
COMMUNITY
End: 2017-10-13 | Stop reason: DRUGHIGH

## 2017-08-16 RX ORDER — SENNOSIDES 8.6 MG
8.6 TABLET ORAL 2 TIMES DAILY
COMMUNITY
End: 2017-10-12

## 2017-08-16 RX ORDER — BISACODYL 10 MG
10 SUPPOSITORY, RECTAL RECTAL AS NEEDED
COMMUNITY
End: 2017-10-12

## 2017-08-16 RX ORDER — ACETAMINOPHEN 325 MG/1
325 TABLET ORAL EVERY 6 HOURS PRN
COMMUNITY
End: 2017-10-12

## 2017-08-16 RX ORDER — WARFARIN SODIUM 4 MG/1
4 TABLET ORAL DAILY
COMMUNITY
End: 2017-10-13 | Stop reason: DRUGHIGH

## 2017-08-16 NOTE — PROGRESS NOTES
[facility] Neurology Outpatient Stroke Follow Up  Date of service: 8/16/2017    Patient here for a follow-up visit for left MCA CVA. Discharged from [facility] on 7/14/2017.     Alise Joshua is a 77year old male with a PMH of thalasemia trait, who initially date: REPAIR ING HERNIA,5+Y/O,REDUCIBL  No date: TONSILLECTOMY  Social History:     Smoking status: Never Smoker    Smokeless tobacco: Not on file    Alcohol use Yes  0.6 oz/week    1 Cans of beer per week          History reviewed.  No pertinent family his dissection with associated stenosis without interluminal thrombus. Patient continues to improve with a NIHSS of 5 today. Will continue patient on Coumadin, until seen in follow up and then will likely convert to ASA 325mg.   Plan for CTA head and neck p [address] [place] [address] Perla    8/16/2017 1:15 PM

## 2017-08-16 NOTE — PATIENT INSTRUCTIONS
Refill policies:    • Allow 2-3 business days for refills; controlled substances may take longer.   • Contact your pharmacy at least 5 days prior to running out of medication and have them send an electronic request or submit request through the [facility] have a procedure or additional testing performed. [facility]) will contact your insurance carrier to obtain pre-certification or prior authorization.     Unfortunately, [facility] has seen an increase in denial of payment even though the p

## 2017-08-16 NOTE — PATIENT INSTRUCTIONS
Refill policies:    • Allow 2-3 business days for refills; controlled substances may take longer.   • Contact your pharmacy at least 5 days prior to running out of medication and have them send an electronic request or submit request through the [facility] have a procedure or additional testing performed. [facility]) will contact your insurance carrier to obtain pre-certification or prior authorization.     Unfortunately, [facility] has seen an increase in denial of payment even though the p

## 2017-08-17 NOTE — PROGRESS NOTES
HPI:    Patient ID: Ashley Bingham is a 77year old male. HPI    Mr Catalina Ingram is a 77year old female who presented for follow up for left MCA stroke. He was discharged from [facility] on 7/14/2017.  Hemaribethtially presented to [facility] from [place] right cervical ICA. Patent vertebral arteries. MRI of the brain dated 7/29/17 evolving subacute left MCA territory infarct with areas of hemorrhagic conversion and laminar necrosis. .  No evidence of any acute infarction    HISTORY:  Past Medical mg by mouth daily. To take with 4 mg tablet to equal 7 mg Disp:  Rfl:    Warfarin Sodium 4 MG Oral Tab Take 4 mg by mouth daily. To take with 3 mg tablet to equal 7 mg Disp:  Rfl:    atorvastatin 80 MG Oral Tab Take 1 tablet (80 mg total) by mouth nightly. weakness. Workup at [facility] was unremarkable. He is recovering well. Continue current management. Continue anticoagulation for 3 months and plan is to then switch him to aspirin repeat CTA of head at 3 months. Continue rehabilitation.   Follow

## 2017-08-21 ENCOUNTER — MED REC SCAN ONLY (OUTPATIENT)
Dept: NEUROLOGY | Facility: CLINIC | Age: 67
End: 2017-08-21

## 2017-08-21 PROCEDURE — 99308 SBSQ NF CARE LOW MDM 20: CPT | Performed by: INTERNAL MEDICINE

## 2017-08-22 ENCOUNTER — SNF/IP PROF CHARGE ONLY (OUTPATIENT)
Dept: INTERNAL MEDICINE CLINIC | Facility: CLINIC | Age: 67
End: 2017-08-22

## 2017-08-22 DIAGNOSIS — I63.9 ACUTE ISCHEMIC STROKE (HCC): ICD-10-CM

## 2017-08-22 DIAGNOSIS — R26.2 DIFFICULTY IN WALKING: ICD-10-CM

## 2017-08-22 DIAGNOSIS — R53.1 RIGHT SIDED WEAKNESS: ICD-10-CM

## 2017-08-22 DIAGNOSIS — I77.71 DISSECTION OF CAROTID ARTERY (HCC): ICD-10-CM

## 2017-08-22 DIAGNOSIS — I60.9 SUBARACHNOID BLEED (HCC): ICD-10-CM

## 2017-08-23 ENCOUNTER — SNF/IP PROF CHARGE ONLY (OUTPATIENT)
Dept: INTERNAL MEDICINE CLINIC | Facility: CLINIC | Age: 67
End: 2017-08-23

## 2017-08-23 DIAGNOSIS — E87.6 HYPOKALEMIA: ICD-10-CM

## 2017-08-23 DIAGNOSIS — I77.71 DISSECTION OF CAROTID ARTERY (HCC): ICD-10-CM

## 2017-08-23 DIAGNOSIS — G81.91 RIGHT HEMIPARESIS (HCC): ICD-10-CM

## 2017-08-23 DIAGNOSIS — I63.9 ACUTE ISCHEMIC STROKE (HCC): ICD-10-CM

## 2017-08-23 DIAGNOSIS — I60.9 SUBARACHNOID BLEED (HCC): ICD-10-CM

## 2017-08-23 PROCEDURE — 99308 SBSQ NF CARE LOW MDM 20: CPT | Performed by: INTERNAL MEDICINE

## 2017-08-28 ENCOUNTER — SNF/IP PROF CHARGE ONLY (OUTPATIENT)
Dept: INTERNAL MEDICINE CLINIC | Facility: CLINIC | Age: 67
End: 2017-08-28

## 2017-08-28 DIAGNOSIS — I77.71 DISSECTION OF CAROTID ARTERY (HCC): ICD-10-CM

## 2017-08-28 DIAGNOSIS — R26.2 DIFFICULTY IN WALKING: ICD-10-CM

## 2017-08-28 DIAGNOSIS — I60.9 SUBARACHNOID BLEED (HCC): ICD-10-CM

## 2017-08-28 DIAGNOSIS — D64.9 ANEMIA, UNSPECIFIED TYPE: ICD-10-CM

## 2017-08-28 DIAGNOSIS — I63.9 ACUTE ISCHEMIC STROKE (HCC): ICD-10-CM

## 2017-08-28 PROCEDURE — 99308 SBSQ NF CARE LOW MDM 20: CPT | Performed by: INTERNAL MEDICINE

## 2017-08-30 ENCOUNTER — SNF/IP PROF CHARGE ONLY (OUTPATIENT)
Dept: INTERNAL MEDICINE CLINIC | Facility: CLINIC | Age: 67
End: 2017-08-30

## 2017-08-30 DIAGNOSIS — I63.9 ACUTE ISCHEMIC STROKE (HCC): ICD-10-CM

## 2017-08-30 DIAGNOSIS — I77.71 DISSECTION OF CAROTID ARTERY (HCC): ICD-10-CM

## 2017-08-30 DIAGNOSIS — R53.1 RIGHT SIDED WEAKNESS: ICD-10-CM

## 2017-08-30 DIAGNOSIS — I60.9 SUBARACHNOID BLEED (HCC): ICD-10-CM

## 2017-08-30 PROCEDURE — 99308 SBSQ NF CARE LOW MDM 20: CPT | Performed by: INTERNAL MEDICINE

## 2017-09-06 ENCOUNTER — SNF/IP PROF CHARGE ONLY (OUTPATIENT)
Dept: INTERNAL MEDICINE CLINIC | Facility: CLINIC | Age: 67
End: 2017-09-06

## 2017-09-06 DIAGNOSIS — G81.91 RIGHT HEMIPARESIS (HCC): ICD-10-CM

## 2017-09-06 DIAGNOSIS — R47.1 DYSARTHRIA: ICD-10-CM

## 2017-09-06 DIAGNOSIS — I63.9 ACUTE ISCHEMIC STROKE (HCC): ICD-10-CM

## 2017-09-06 DIAGNOSIS — E87.6 HYPOKALEMIA: ICD-10-CM

## 2017-09-06 DIAGNOSIS — I60.9 SUBARACHNOID BLEED (HCC): ICD-10-CM

## 2017-09-06 DIAGNOSIS — I77.71 DISSECTION OF CAROTID ARTERY (HCC): ICD-10-CM

## 2017-09-06 DIAGNOSIS — R26.2 DIFFICULTY IN WALKING: ICD-10-CM

## 2017-09-06 PROCEDURE — 99308 SBSQ NF CARE LOW MDM 20: CPT | Performed by: INTERNAL MEDICINE

## 2017-09-16 PROCEDURE — 99308 SBSQ NF CARE LOW MDM 20: CPT | Performed by: INTERNAL MEDICINE

## 2017-09-20 PROCEDURE — 99308 SBSQ NF CARE LOW MDM 20: CPT | Performed by: INTERNAL MEDICINE

## 2017-09-27 ENCOUNTER — SNF/IP PROF CHARGE ONLY (OUTPATIENT)
Dept: INTERNAL MEDICINE CLINIC | Facility: CLINIC | Age: 67
End: 2017-09-27

## 2017-09-27 DIAGNOSIS — R53.1 RIGHT SIDED WEAKNESS: ICD-10-CM

## 2017-09-27 DIAGNOSIS — I60.9 SUBARACHNOID BLEED (HCC): ICD-10-CM

## 2017-09-27 DIAGNOSIS — R26.2 DIFFICULTY IN WALKING: ICD-10-CM

## 2017-09-27 DIAGNOSIS — I63.9 ACUTE ISCHEMIC STROKE (HCC): ICD-10-CM

## 2017-09-27 DIAGNOSIS — I77.71 DISSECTION OF CAROTID ARTERY (HCC): ICD-10-CM

## 2017-10-12 ENCOUNTER — OFFICE VISIT (OUTPATIENT)
Dept: SURGERY | Facility: CLINIC | Age: 67
End: 2017-10-12

## 2017-10-12 VITALS — DIASTOLIC BLOOD PRESSURE: 70 MMHG | SYSTOLIC BLOOD PRESSURE: 120 MMHG | HEART RATE: 80 BPM

## 2017-10-12 DIAGNOSIS — I63.512 CEREBROVASCULAR ACCIDENT (CVA) DUE TO OCCLUSION OF LEFT MIDDLE CEREBRAL ARTERY (HCC): Primary | ICD-10-CM

## 2017-10-12 DIAGNOSIS — I77.71 DISSECTION OF CAROTID ARTERY (HCC): ICD-10-CM

## 2017-10-12 PROCEDURE — 99215 OFFICE O/P EST HI 40 MIN: CPT

## 2017-10-12 NOTE — PROGRESS NOTES
Patient here for a follow-up visit for left MCA CVA. Discharged from [facility] on 7/14/2017.     Review of Systems:    Hand Dominance: left  General: no symptoms reported  Neuro: no symptoms reported  Head: no symptoms reported  Musculoskeletal: no sy

## 2017-10-12 NOTE — PROGRESS NOTES
[facility]  Interventional Neuroradiology Clinic Note      Lexii Ahmadi MD  Neurology  [place]  [id number]  Primary Care      Date of Service: 10/12/2017    Dear Roxann Gunn,     We had the pleasure of seeing [facility] outpatient rehabilitation, the patient returns for his 3 month post-stroke clinical and angiographic follow-up evaluation for the cervical left internal carotid artery dissection. Medications:  •  Warfarin Sodium 3 MG Oral Tab, Take 3 mg by mouth daily. well as pronator drift on [place] testing. The patient is able to bear weight on the right lower extremity, walking with assistance. Romberg testing is negative.    Mild dysarthria is also superimposed on significant residual, but improved expressive aphas avoid radiation and follow the cervical dissection pathology at 6 months, 1 year, 2 years, and every 2 years.      We counseled the patient on cerebrovascular risk factor reduction and neck manipulation precautions in setting of prior cervical dissection fo

## 2017-10-12 NOTE — PATIENT INSTRUCTIONS
Refill policies:    • Allow 2-3 business days for refills; controlled substances may take longer.   • Contact your pharmacy at least 5 days prior to running out of medication and have them send an electronic request or submit request through the [facility] have a procedure or additional testing performed. [facility]) will contact your insurance carrier to obtain pre-certification or prior authorization.     Unfortunately, [facility] has seen an increase in denial of payment even though the p

## 2017-10-13 ENCOUNTER — OFFICE VISIT (OUTPATIENT)
Dept: NEUROLOGY | Facility: CLINIC | Age: 67
End: 2017-10-13

## 2017-10-13 VITALS — RESPIRATION RATE: 18 BRPM | SYSTOLIC BLOOD PRESSURE: 122 MMHG | DIASTOLIC BLOOD PRESSURE: 78 MMHG | HEART RATE: 70 BPM

## 2017-10-13 DIAGNOSIS — I77.71 DISSECTION OF CAROTID ARTERY (HCC): Primary | ICD-10-CM

## 2017-10-13 DIAGNOSIS — I69.30 CHRONIC ISCHEMIC LEFT MCA STROKE: ICD-10-CM

## 2017-10-13 PROCEDURE — 99213 OFFICE O/P EST LOW 20 MIN: CPT | Performed by: OTHER

## 2017-10-13 RX ORDER — ATORVASTATIN CALCIUM 20 MG/1
20 TABLET, FILM COATED ORAL NIGHTLY
Qty: 30 TABLET | Refills: 2 | Status: SHIPPED | OUTPATIENT
Start: 2017-10-13 | End: 2018-07-11

## 2017-10-13 RX ORDER — WARFARIN SODIUM 1 MG/1
6.5 TABLET ORAL DAILY
Refills: 0 | COMMUNITY
Start: 2017-09-25 | End: 2018-01-17 | Stop reason: ALTCHOICE

## 2017-10-13 RX ORDER — WARFARIN SODIUM 5 MG/1
6.5 TABLET ORAL DAILY
Refills: 0 | COMMUNITY
Start: 2017-09-24 | End: 2018-01-17 | Stop reason: ALTCHOICE

## 2017-10-13 NOTE — PROGRESS NOTES
Patient here to follow up regarding a stroke in 7/2017. States he is getting a little better since last visit. Here to discuss the next steps.

## 2017-10-13 NOTE — PATIENT INSTRUCTIONS
Refill policies:    • Allow 2-3 business days for refills; controlled substances may take longer.   • Contact your pharmacy at least 5 days prior to running out of medication and have them send an electronic request or submit request through the [facility] have a procedure or additional testing performed. [facility]) will contact your insurance carrier to obtain pre-certification or prior authorization.     Unfortunately, [facility] has seen an increase in denial of payment even though the p

## 2017-10-16 NOTE — PROGRESS NOTES
HPI:    Patient ID: Tessie Arias is a 79year old male. HPI      Mr Kaya Gaxiola is a 79year old female who presented for follow up for left MCA stroke. States he is doing better since last visit.  He noticed improvement in the right hand movements, states he Smokeless tobacco: Never Used                      Alcohol use: No                          Review of Systems   HENT: Negative. Eyes: Negative. Respiratory: Negative. Cardiovascular: Negative. Gastrointestinal: Negative.     Endocrine: Neg carotid artery (HCC)  (primary encounter diagnosis)    (I69.30) Chronic ischemic left MCA stroke        Left MCA stroke s/p mechanical clot removal complicated by mild asypmtomatic SAH    Continue current management.  Continue anticoagulation for total 6 mo

## 2017-10-17 ENCOUNTER — HOSPITAL ENCOUNTER (OUTPATIENT)
Dept: CT IMAGING | Facility: HOSPITAL | Age: 67
Discharge: HOME | End: 2017-10-17
Attending: NURSE PRACTITIONER
Payer: MEDICARE

## 2017-10-17 DIAGNOSIS — I63.512 CEREBROVASCULAR ACCIDENT (CVA) DUE TO OCCLUSION OF LEFT MIDDLE CEREBRAL ARTERY (HCC): ICD-10-CM

## 2017-10-17 PROCEDURE — 82565 ASSAY OF CREATININE: CPT

## 2017-10-17 PROCEDURE — 70498 CT ANGIOGRAPHY NECK: CPT | Performed by: NURSE PRACTITIONER

## 2017-10-17 PROCEDURE — 70496 CT ANGIOGRAPHY HEAD: CPT | Performed by: NURSE PRACTITIONER

## 2017-12-28 NOTE — PROGRESS NOTES
CT head done. Slight worsening of the left hemispheric edema with midline shift of 4 mm. Evolving left MCA infarction. Stable SAH    We will start him on Mannitol. Monitor closely and continue bedrest with head elevation 30 deg.  Repeat CT head in am or ear SBAR IN Report: Mother    Verbal report received from Davion Nuñez RN (full name & credentials) on this patient, who is now being transferred from [facility] (unit) for routine progression of care. The patient is not wearing a green \"Anesthesia-Duramorph\" band. Report consisted of patient's Situation, Background, Assessment and Recommendations (SBAR). [place] ID bands were compared with the identification form, and verified with the patient and transferring nurse. Information from the Procedure Summary and the [place] Report was reviewed with the transferring nurse; opportunity for questions and clarification provided.

## 2018-01-17 ENCOUNTER — OFFICE VISIT (OUTPATIENT)
Dept: NEUROLOGY | Facility: CLINIC | Age: 68
End: 2018-01-17

## 2018-01-17 ENCOUNTER — OFFICE VISIT (OUTPATIENT)
Dept: SURGERY | Facility: CLINIC | Age: 68
End: 2018-01-17

## 2018-01-17 VITALS — HEART RATE: 68 BPM | DIASTOLIC BLOOD PRESSURE: 80 MMHG | SYSTOLIC BLOOD PRESSURE: 114 MMHG

## 2018-01-17 VITALS
WEIGHT: 131 LBS | BODY MASS INDEX: 20 KG/M2 | HEART RATE: 72 BPM | DIASTOLIC BLOOD PRESSURE: 70 MMHG | SYSTOLIC BLOOD PRESSURE: 120 MMHG

## 2018-01-17 DIAGNOSIS — I69.30 CHRONIC ISCHEMIC LEFT MCA STROKE: ICD-10-CM

## 2018-01-17 DIAGNOSIS — I77.71 CAROTID ARTERY DISSECTION (HCC): Primary | ICD-10-CM

## 2018-01-17 DIAGNOSIS — I63.512 CEREBRAL INFARCTION DUE TO OCCLUSION OF LEFT MIDDLE CEREBRAL ARTERY (HCC): Primary | ICD-10-CM

## 2018-01-17 PROCEDURE — 99213 OFFICE O/P EST LOW 20 MIN: CPT | Performed by: OTHER

## 2018-01-17 PROCEDURE — 99212 OFFICE O/P EST SF 10 MIN: CPT | Performed by: NURSE PRACTITIONER

## 2018-01-17 RX ORDER — WARFARIN SODIUM 6 MG/1
6 TABLET ORAL DAILY
Refills: 0 | COMMUNITY
Start: 2018-01-03 | End: 2018-01-17

## 2018-01-17 RX ORDER — ASPIRIN 325 MG
325 TABLET ORAL DAILY
COMMUNITY

## 2018-01-17 NOTE — PROGRESS NOTES
[facility]  Interventional Neuroradiology Progress Note    Ashley Bingham Patient Status:  No patient class for patient encounter    1950 MRN [id number]   Location ED [facility], [address] [place] [address] Attending No att. providers eunice unable to state place or time  Speech: moderate expressive aphasia, minor receptive aphasia (as he at times requires queuing to understand what is being said), no dysarthria  Memory and comprehension: Intact   Cranial Nerves: VFF, PERRL 3mm brisk, EOMI, no have MRA completed ( at 1 year, 2 years and every 2 years after)  Continue Asa 325 mg and Atorvastatin 20 mg  No further follow up with interventional neuroradiology, can defer future follow up with general neurologist, Dr. Maite Meza  Patient counseled abou

## 2018-01-17 NOTE — PROGRESS NOTES
Patient here for 6 month stroke follow up. States he is doing better than he was at last OV in October 2017.

## 2018-01-17 NOTE — PATIENT INSTRUCTIONS
Interventional Neuroradiology Instructions:   1. Please call to schedule CTA imaging of head and Neck  2. No longer need to follow up in interventional neuroradiology clinic  3. Will defer future follow up with Neurologist, Dr. Maite Meza  4.  Continue Atorva schedule the test until this office has notified you that the test has been approved by your insurer. Depending on your insurance carrier, approval may take 3-10 days.  It is highly recommended patients contact their insurance carrier directly to determine

## 2018-01-17 NOTE — PATIENT INSTRUCTIONS
Refill policies:    • Allow 2-3 business days for refills; controlled substances may take longer.   • Contact your pharmacy at least 5 days prior to running out of medication and have them send an electronic request or submit request through the [place] [facility] recommended that you have a procedure or additional testing performed. [facility]) will contact your insurance carrier to obtain pre-certification or prior authorization.     Unfortunately, [facility] has seen an increase in denial of paym

## 2018-01-19 NOTE — PROGRESS NOTES
HPI:    Patient ID: Mandie Vieyra is a 79year old male. HPI      Mr Abrahan Leos is a 79year old female who presented for follow up for left MCA stroke due to left carotid dissection status post mechanical thrombectomy .  He reports improvement in the right h lb.    Vitals reviewed  General: sitting in wheel chair  Head: Normocephalic and atraumatic. Cardiovascular: Normal rate, regular rhythm and normal heart sounds. Pulmonary/Chest: Effort normal and breath sounds normal.   Abdominal: Soft.  Bowel sounds

## 2018-02-27 ENCOUNTER — HOSPITAL ENCOUNTER (OUTPATIENT)
Dept: CT IMAGING | Facility: HOSPITAL | Age: 68
Discharge: HOME | End: 2018-02-27
Attending: NURSE PRACTITIONER
Payer: MEDICARE

## 2018-02-27 DIAGNOSIS — I63.512 CEREBRAL INFARCTION DUE TO OCCLUSION OF LEFT MIDDLE CEREBRAL ARTERY (HCC): ICD-10-CM

## 2018-02-27 LAB — CREAT SERPL-MCNC: 0.7 MG/DL (ref 0.7–1.3)

## 2018-02-27 PROCEDURE — 70496 CT ANGIOGRAPHY HEAD: CPT | Performed by: NURSE PRACTITIONER

## 2018-02-27 PROCEDURE — 82565 ASSAY OF CREATININE: CPT

## 2018-02-27 PROCEDURE — 70498 CT ANGIOGRAPHY NECK: CPT | Performed by: NURSE PRACTITIONER

## 2018-04-25 NOTE — PLAN OF CARE
Problem: Impaired Functional Mobility  Goal: Achieve highest/safest level of mobility/gait  Interventions:  - Assess patient's functional ability and stability  - Promote increasing activity/tolerance for mobility and gait  - Educate and engage patient/fam 61.2

## 2018-07-06 ENCOUNTER — TELEPHONE (OUTPATIENT)
Dept: SURGERY | Facility: CLINIC | Age: 68
End: 2018-07-06

## 2018-07-06 NOTE — TELEPHONE ENCOUNTER
LM informing pt we were rescheduling his appt from 11 am to 12pm on 7/9/18.     New appt time is 12 pm.

## 2018-07-09 ENCOUNTER — OFFICE VISIT (OUTPATIENT)
Dept: SURGERY | Facility: CLINIC | Age: 68
End: 2018-07-09

## 2018-07-09 VITALS — WEIGHT: 132 LBS | HEIGHT: 68 IN | BODY MASS INDEX: 20 KG/M2

## 2018-07-09 DIAGNOSIS — I65.22 OCCLUSION AND STENOSIS OF LEFT CAROTID ARTERY: Primary | ICD-10-CM

## 2018-07-09 PROCEDURE — 99211 OFF/OP EST MAY X REQ PHY/QHP: CPT

## 2018-07-09 NOTE — PROGRESS NOTES
[facility] Neurology Outpatient Stroke Follow Up  Date of service: 7/9/2018    Patient here for a follow-up visit for left MCA CVA. Discharged from [facility] on 7/14/2017.     Tessie Arias is a 79year old male with a PMH of thalasemia trait, who initially p Comment: cyst removal from thumb  No date: REPAIR ING HERNIA,5+Y/O,REDUCIBL  No date: TONSILLECTOMY  Social History:     Smoking status: Never Smoker    Smokeless tobacco: Never Used    Alcohol use No     Family History   Problem Relation Age of Onset   • recommendations.     · Left ICA dissection/occlusion and M2 occlusion- now post successful recanalizaiton  · Continue ASA 325mg   · Continue Atorvastatin 20mg  · Continue  as recommended   · In future can have MRA completed at 2 years and every 2 years afte

## 2018-07-09 NOTE — PATIENT INSTRUCTIONS
Refill policies:    • Allow 2-3 business days for refills; controlled substances may take longer.   • Contact your pharmacy at least 5 days prior to running out of medication and have them send an electronic request or submit request through the “request re entire amount billed. Precertification and Prior Authorizations: If your physician has recommended that you have a procedure or additional testing performed.   [facility]) will contact your insurance carrier to obtain pre-certi

## 2018-07-11 ENCOUNTER — OFFICE VISIT (OUTPATIENT)
Dept: NEUROLOGY | Facility: CLINIC | Age: 68
End: 2018-07-11

## 2018-07-11 VITALS
WEIGHT: 131 LBS | HEART RATE: 72 BPM | SYSTOLIC BLOOD PRESSURE: 128 MMHG | DIASTOLIC BLOOD PRESSURE: 80 MMHG | BODY MASS INDEX: 20 KG/M2

## 2018-07-11 DIAGNOSIS — I77.71 DISSECTION OF CAROTID ARTERY (HCC): ICD-10-CM

## 2018-07-11 DIAGNOSIS — I69.30 CHRONIC ISCHEMIC LEFT MCA STROKE: Primary | ICD-10-CM

## 2018-07-11 PROCEDURE — 99213 OFFICE O/P EST LOW 20 MIN: CPT | Performed by: OTHER

## 2018-07-11 RX ORDER — ATORVASTATIN CALCIUM 20 MG/1
20 TABLET, FILM COATED ORAL NIGHTLY
Qty: 30 TABLET | Refills: 2 | Status: SHIPPED | OUTPATIENT
Start: 2018-07-11 | End: 2018-10-15

## 2018-07-11 RX ORDER — ATORVASTATIN CALCIUM 10 MG/1
10 TABLET, FILM COATED ORAL NIGHTLY
COMMUNITY

## 2018-07-11 NOTE — PATIENT INSTRUCTIONS
Refill policies:    • Allow 2-3 business days for refills; controlled substances may take longer.   • Contact your pharmacy at least 5 days prior to running out of medication and have them send an electronic request or submit request through the “request re entire amount billed. Precertification and Prior Authorizations: If your physician has recommended that you have a procedure or additional testing performed.   [facility]) will contact your insurance carrier to obtain pre-certi

## 2018-07-11 NOTE — PROGRESS NOTES
Patient here for 1 year stroke follow up. States he has been doing much better and recovering well. Patient currently lifts 20 pounds and would like to discuss if he can lift 50 pounds.

## 2018-07-11 NOTE — PROGRESS NOTES
HPI:    Patient ID: Kevin Conner is a 79year old male.     HPI      Mr Bobbi Wei is a 79year old female who presented for follow up for left MCA stroke due to left carotid dissection status post mechanical thrombectomy under ECHO trial . He made significant 325 mg by mouth daily. Disp:  Rfl:      Allergies:  Tetracycline            RASH   PHYSICAL EXAM:   Physical Exam    Blood pressure 128/80, pulse 72, weight 131 lb. Vitals reviewed  General: sitting in wheel chair  Head: Normocephalic and atraumatic.

## 2018-10-15 DIAGNOSIS — I77.71 DISSECTION OF CAROTID ARTERY (HCC): Primary | ICD-10-CM

## 2018-10-15 RX ORDER — ATORVASTATIN CALCIUM 20 MG/1
TABLET, FILM COATED ORAL
Qty: 30 TABLET | Refills: 2 | Status: SHIPPED | OUTPATIENT
Start: 2018-10-15

## 2021-02-11 NOTE — SLP NOTE
Attempted to see pt for speech therapy services. Per MD, pt unable to participate in video swallow study due to pt unable to sit up beyond 30 degrees. Will continue to follow. Thank you.     Sheryl Bright M.S. Gila Luciano  Pager [phone] no loss of consciousness, no gait abnormality, no headache, no sensory deficits, and no weakness.

## 2022-08-22 ENCOUNTER — HOSPITAL ENCOUNTER (EMERGENCY)
Facility: HOSPITAL | Age: 72
Discharge: HOME | End: 2022-08-22
Payer: MEDICARE

## 2022-08-22 VITALS
OXYGEN SATURATION: 97 % | TEMPERATURE: 99 F | HEART RATE: 81 BPM | DIASTOLIC BLOOD PRESSURE: 78 MMHG | WEIGHT: 140 LBS | BODY MASS INDEX: 23.32 KG/M2 | RESPIRATION RATE: 20 BRPM | SYSTOLIC BLOOD PRESSURE: 158 MMHG | HEIGHT: 65 IN

## 2022-08-22 DIAGNOSIS — N30.00 ACUTE CYSTITIS WITHOUT HEMATURIA: Primary | ICD-10-CM

## 2022-08-22 LAB
BILIRUB UR QL: NEGATIVE
COLOR UR: YELLOW
GLUCOSE UR-MCNC: NEGATIVE MG/DL
KETONES UR-MCNC: NEGATIVE MG/DL
NITRITE UR QL STRIP.AUTO: POSITIVE
PH UR: 6 [PH] (ref 5–8)
RBC #/AREA URNS AUTO: >10 /HPF
RBC #/AREA URNS AUTO: >10 /HPF
SP GR UR STRIP: 1.02 (ref 1–1.03)
UROBILINOGEN UR STRIP-ACNC: 0.2
WBC #/AREA URNS AUTO: >50 /HPF
WBC #/AREA URNS AUTO: >50 /HPF

## 2022-08-22 PROCEDURE — 81001 URINALYSIS AUTO W/SCOPE: CPT

## 2022-08-22 PROCEDURE — 87088 URINE BACTERIA CULTURE: CPT

## 2022-08-22 PROCEDURE — 81015 MICROSCOPIC EXAM OF URINE: CPT

## 2022-08-22 PROCEDURE — 87186 SC STD MICRODIL/AGAR DIL: CPT

## 2022-08-22 PROCEDURE — 87086 URINE CULTURE/COLONY COUNT: CPT

## 2022-08-22 PROCEDURE — 96365 THER/PROPH/DIAG IV INF INIT: CPT

## 2022-08-22 PROCEDURE — 99284 EMERGENCY DEPT VISIT MOD MDM: CPT

## 2022-08-22 RX ORDER — CEPHALEXIN 250 MG/5ML
500 POWDER, FOR SUSPENSION ORAL 4 TIMES DAILY
Qty: 400 ML | Refills: 0 | Status: SHIPPED | OUTPATIENT
Start: 2022-08-22 | End: 2022-09-01

## 2022-08-22 NOTE — ED QUICK NOTES
Pt states today he started having dysuria, urinary frequency and small amounts of urine. Denies fevers, vomiting, abd/flank or back pain.

## 2022-08-26 ENCOUNTER — APPOINTMENT (OUTPATIENT)
Dept: CT IMAGING | Facility: HOSPITAL | Age: 72
End: 2022-08-26
Attending: EMERGENCY MEDICINE
Payer: MEDICARE

## 2022-08-26 ENCOUNTER — HOSPITAL ENCOUNTER (EMERGENCY)
Facility: HOSPITAL | Age: 72
Discharge: HOME | End: 2022-08-26
Attending: EMERGENCY MEDICINE
Payer: MEDICARE

## 2022-08-26 VITALS
HEIGHT: 65 IN | OXYGEN SATURATION: 97 % | RESPIRATION RATE: 16 BRPM | TEMPERATURE: 97 F | WEIGHT: 140 LBS | BODY MASS INDEX: 23.32 KG/M2 | DIASTOLIC BLOOD PRESSURE: 82 MMHG | SYSTOLIC BLOOD PRESSURE: 143 MMHG | HEART RATE: 63 BPM

## 2022-08-26 DIAGNOSIS — N30.90 CYSTITIS: Primary | ICD-10-CM

## 2022-08-26 PROCEDURE — 99284 EMERGENCY DEPT VISIT MOD MDM: CPT

## 2022-08-26 PROCEDURE — 74176 CT ABD & PELVIS W/O CONTRAST: CPT | Performed by: EMERGENCY MEDICINE

## 2022-08-26 RX ORDER — CIPROFLOXACIN 500 MG/1
500 TABLET, FILM COATED ORAL 2 TIMES DAILY
Qty: 14 TABLET | Refills: 0 | Status: SHIPPED | OUTPATIENT
Start: 2022-08-26 | End: 2022-09-02

## 2022-08-26 RX ORDER — CIPROFLOXACIN 500 MG/1
500 TABLET, FILM COATED ORAL
Status: DISCONTINUED | OUTPATIENT
Start: 2022-08-26 | End: 2022-08-26

## 2022-08-26 RX ORDER — CIPROFLOXACIN 500 MG/1
500 TABLET, FILM COATED ORAL ONCE
Status: COMPLETED | OUTPATIENT
Start: 2022-08-26 | End: 2022-08-26

## 2022-08-26 NOTE — ED INITIAL ASSESSMENT (HPI)
Pt seen here on Monday and dx with a uti. Pt reports that the pain began last night and was unable to sleep due to the pain.

## 2024-08-13 ENCOUNTER — APPOINTMENT (OUTPATIENT)
Dept: GENERAL RADIOLOGY | Facility: HOSPITAL | Age: 74
End: 2024-08-13
Payer: MEDICARE

## 2024-08-13 ENCOUNTER — HOSPITAL ENCOUNTER (EMERGENCY)
Facility: HOSPITAL | Age: 74
Discharge: HOME | End: 2024-08-13
Attending: STUDENT IN AN ORGANIZED HEALTH CARE EDUCATION/TRAINING PROGRAM
Payer: MEDICARE

## 2024-08-13 VITALS
WEIGHT: 135 LBS | DIASTOLIC BLOOD PRESSURE: 88 MMHG | TEMPERATURE: 99 F | BODY MASS INDEX: 22 KG/M2 | HEART RATE: 77 BPM | SYSTOLIC BLOOD PRESSURE: 114 MMHG | RESPIRATION RATE: 16 BRPM | OXYGEN SATURATION: 98 %

## 2024-08-13 DIAGNOSIS — R00.2 PALPITATIONS: Primary | ICD-10-CM

## 2024-08-13 LAB
ANION GAP SERPL CALC-SCNC: 9 MMOL/L (ref 0–18)
ATRIAL RATE: 80 BPM
BASOPHILS # BLD AUTO: 0.03 X10(3) UL (ref 0–0.2)
BASOPHILS NFR BLD AUTO: 0.5 %
BUN BLD-MCNC: 12 MG/DL (ref 9–23)
BUN/CREAT SERPL: 17.9 (ref 10–20)
CALCIUM BLD-MCNC: 9.7 MG/DL (ref 8.7–10.4)
CHLORIDE SERPL-SCNC: 107 MMOL/L (ref 98–112)
CO2 SERPL-SCNC: 26 MMOL/L (ref 21–32)
CREAT BLD-MCNC: 0.67 MG/DL
DEPRECATED RDW RBC AUTO: 36.5 FL (ref 35.1–46.3)
EGFRCR SERPLBLD CKD-EPI 2021: 99 ML/MIN/1.73M2 (ref 60–?)
EOSINOPHIL # BLD AUTO: 0.11 X10(3) UL (ref 0–0.7)
EOSINOPHIL NFR BLD AUTO: 2 %
ERYTHROCYTE [DISTWIDTH] IN BLOOD BY AUTOMATED COUNT: 18 % (ref 11–15)
GLUCOSE BLD-MCNC: 89 MG/DL (ref 70–99)
HCT VFR BLD AUTO: 36.5 %
HGB BLD-MCNC: 12 G/DL
IMM GRANULOCYTES # BLD AUTO: 0.02 X10(3) UL (ref 0–1)
IMM GRANULOCYTES NFR BLD: 0.4 %
LYMPHOCYTES # BLD AUTO: 1.44 X10(3) UL (ref 1–4)
LYMPHOCYTES NFR BLD AUTO: 26.2 %
MCH RBC QN AUTO: 20.7 PG (ref 26–34)
MCHC RBC AUTO-ENTMCNC: 32.9 G/DL (ref 31–37)
MCV RBC AUTO: 62.9 FL
MONOCYTES # BLD AUTO: 0.43 X10(3) UL (ref 0.1–1)
MONOCYTES NFR BLD AUTO: 7.8 %
NEUTROPHILS # BLD AUTO: 3.46 X10 (3) UL (ref 1.5–7.7)
NEUTROPHILS # BLD AUTO: 3.46 X10(3) UL (ref 1.5–7.7)
NEUTROPHILS NFR BLD AUTO: 63.1 %
OSMOLALITY SERPL CALC.SUM OF ELEC: 293 MOSM/KG (ref 275–295)
P AXIS: 22 DEGREES
P-R INTERVAL: 164 MS
PLATELET # BLD AUTO: 293 10(3)UL (ref 150–450)
POTASSIUM SERPL-SCNC: 3.9 MMOL/L (ref 3.5–5.1)
Q-T INTERVAL: 390 MS
QRS DURATION: 90 MS
QTC CALCULATION (BEZET): 449 MS
R AXIS: -16 DEGREES
RBC # BLD AUTO: 5.8 X10(6)UL
SODIUM SERPL-SCNC: 142 MMOL/L (ref 136–145)
T AXIS: 10 DEGREES
TROPONIN I SERPL HS-MCNC: 6 NG/L
VENTRICULAR RATE: 80 BPM
WBC # BLD AUTO: 5.5 X10(3) UL (ref 4–11)

## 2024-08-13 PROCEDURE — 80048 BASIC METABOLIC PNL TOTAL CA: CPT

## 2024-08-13 PROCEDURE — 85025 COMPLETE CBC W/AUTO DIFF WBC: CPT

## 2024-08-13 PROCEDURE — 93005 ELECTROCARDIOGRAM TRACING: CPT

## 2024-08-13 PROCEDURE — 85025 COMPLETE CBC W/AUTO DIFF WBC: CPT | Performed by: STUDENT IN AN ORGANIZED HEALTH CARE EDUCATION/TRAINING PROGRAM

## 2024-08-13 PROCEDURE — 71045 X-RAY EXAM CHEST 1 VIEW: CPT

## 2024-08-13 PROCEDURE — 99285 EMERGENCY DEPT VISIT HI MDM: CPT

## 2024-08-13 PROCEDURE — 84484 ASSAY OF TROPONIN QUANT: CPT

## 2024-08-13 PROCEDURE — 85060 BLOOD SMEAR INTERPRETATION: CPT | Performed by: STUDENT IN AN ORGANIZED HEALTH CARE EDUCATION/TRAINING PROGRAM

## 2024-08-13 PROCEDURE — 99284 EMERGENCY DEPT VISIT MOD MDM: CPT

## 2024-08-13 PROCEDURE — 93010 ELECTROCARDIOGRAM REPORT: CPT

## 2024-08-13 PROCEDURE — 36415 COLL VENOUS BLD VENIPUNCTURE: CPT

## 2024-08-13 PROCEDURE — 84484 ASSAY OF TROPONIN QUANT: CPT | Performed by: STUDENT IN AN ORGANIZED HEALTH CARE EDUCATION/TRAINING PROGRAM

## 2024-08-13 PROCEDURE — 80048 BASIC METABOLIC PNL TOTAL CA: CPT | Performed by: STUDENT IN AN ORGANIZED HEALTH CARE EDUCATION/TRAINING PROGRAM

## 2024-08-13 PROCEDURE — 85060 BLOOD SMEAR INTERPRETATION: CPT

## 2024-08-13 NOTE — ED INITIAL ASSESSMENT (HPI)
Patient reports episode of dizziness/wooziness x 1 hour. Checked heart rate, noted to be 174. Denies any chest pain or shortness of breath. States he had this happen before on July 14, was not seen for it that time because it resolved.

## 2024-08-13 NOTE — ED QUICK NOTES
Patient safe to be discharged home per provider. Discharge education given via printed AVS. Reviewed with patient and son: follow up care with cardiology tomorrow, medications and when to seek emergency care. Patient wheeled to exit.

## 2024-08-13 NOTE — DISCHARGE INSTRUCTIONS
Return to the emergency department if you develop any chest pain or difficulty breathing SS-B signs of worsening medical condition.

## 2024-08-14 NOTE — ED PROVIDER NOTES
[place] Emergency Department Note  Patient: Mik Schmidt Age: 73 year old Sex: male      MRN: [id number]  : 1950    Patient Seen in: [facility] Emergency Department    History     Chief Complaint   Patient presents with    Arrythmia/Palpitations     Stated Complaint: palpitation    History obtained from: Patient     patient is a 73-year-old male with a past medical history of carotid artery dissection, CVA, subarachnoid hemorrhage presenting today for evaluation of rapid palpitations.  He states that approximately an hour ago, he had an hour long episode of lightheadedness.  He states that during this episode, he checked his heart rate and noted to be elevated to the 160s to 170s.  He states that his blood pressure remained stable.  He states that the symptoms resolved spontaneously by the time of his evaluation in the emergency department.  He denies any associated chest pain or shortness of breath.  Denies any numbness, tingling, weakness, slurred speech or vision changes.  He states that he has this similar episode approximately a month ago during which his blood pressure was stable but his heart rate was elevated to the 160s.  States he did not seek medical care at that time his symptoms resolved spontaneously.      Review of Systems:  Review of Systems  Positive for stated complaint: palpitation. Constitutional and vital signs reviewed. All other systems reviewed and negative except as noted above.    Patient History:  Past Medical History:    Dissection of carotid artery (HCC)    Stroke (HCC)    Subarachnoid bleed (HCC)       Past Surgical History:   Procedure Laterality Date    Other Right     eye surgery     Other Left     cyst removal from thumb    Repair ing hernia,5+y/o,reducibl      Tonsillectomy          Family History   Problem Relation Age of Onset    Hypertension Father        Specific Social Determinants of Health:   Social History     Socioeconomic History    Marital status:     Tobacco Use    Smoking status: Never    Smokeless tobacco: Never   Vaping Use    Vaping status: Never Used   Substance and Sexual Activity    Alcohol use: No     Alcohol/week: 1.0 standard drink of alcohol     Types: 1 Cans of beer per week    Drug use: No   Other Topics Concern    Caffeine Concern Yes     Comment: 1 cup coffee per day    Exercise Yes     Comment: 3x per week           PSFH elements reviewed from today and agreed except as otherwise stated in HPI.    Physical Exam     ED Triage Vitals [08/13/24 1325]   /74   Pulse 84   Resp 18   Temp 98.5 °F (36.9 °C)   Temp src    SpO2 95 %   O2 Device None (Room air)       Current:/88   Pulse 77   Temp 98.5 °F (36.9 °C)   Resp 16   Wt 61.2 kg   SpO2 98%   BMI 22.47 kg/m²         Physical Exam  Constitutional:       Appearance: He is well-developed.   HENT:      Head: Normocephalic and atraumatic.      Right Ear: External ear normal.      Left Ear: External ear normal.      Nose: Nose normal.   Eyes:      Conjunctiva/sclera: Conjunctivae normal.      Pupils: Pupils are equal, round, and reactive to light.   Cardiovascular:      Rate and Rhythm: Normal rate and regular rhythm.      Heart sounds: Normal heart sounds.   Pulmonary:      Effort: Pulmonary effort is normal.      Breath sounds: Normal breath sounds.   Abdominal:      General: Bowel sounds are normal.      Palpations: Abdomen is soft.      Tenderness: There is no abdominal tenderness.   Musculoskeletal:         General: Normal range of motion.      Cervical back: Normal range of motion and neck supple.   Skin:     General: Skin is warm and dry.      Findings: No rash.   Neurological:      General: No focal deficit present.      Mental Status: He is alert and oriented to person, place, and time.      Deep Tendon Reflexes: Reflexes are normal and symmetric.   Psychiatric:         Mood and Affect: Mood normal.         Behavior: Behavior normal.         ED Course   Labs:   Labs  Reviewed   CBC WITH DIFFERENTIAL WITH PLATELET - Abnormal; Notable for the following components:       Result Value    HGB 12.0 (*)     HCT 36.5 (*)     MCV 62.9 (*)     MCH 20.7 (*)     RDW 18.0 (*)     All other components within normal limits   BASIC METABOLIC PANEL (8) - Abnormal; Notable for the following components:    Creatinine 0.67 (*)     All other components within normal limits   TROPONIN I HIGH SENSITIVITY - Normal   MD BLOOD SMEAR CONSULT     Radiology findings:  I personally reviewed the images.   XR CHEST AP PORTABLE  (CPT=71045)    Result Date: 8/13/2024  CONCLUSION: No acute cardiopulmonary abnormality.   Dictated by (CST): Michael Vargas MD on 8/13/2024 at 2:35 PM     Finalized by (CST): Michael Vargas MD on 8/13/2024 at 2:36 PM           EKG as interpreted by me: Ventricular rate 80, normal sinus rhythm with sinus arrhythmia, normal axis, no LA interval prolongation, narrow QRS, QTc 449 ms, no ST segment elevations or depressions, no abnormal T wave inversions  Cardiac Monitor: Interpreted by me.   Pulse Readings from Last 1 Encounters:   08/13/24 77   , sinus,     External non-ED records reviewed independently by me: Echocardiogram from 7/1/2017 with ejection fraction of 55 to 60% with no regional wall motion abnormalities.  Mildly thickened mitral valve with trivial regurgitation, trivial aortic valve regurgitation with no stenosis, trivial to mild regurgitation of the tricuspid valve and trivial regurgitation of the pulmonic valve.    MDM   73-year-old male with a past medical history of carotid artery dissection, CVA, subarachnoid hemorrhage for evaluation of rapid palpitations x 1 hour earlier today.  All symptoms resolved currently.  Upon arrival to the emergency department, he is well-appearing and in no acute distress.  Asymptomatic.  Normal sinus rhythm with sinus arrhythmia on telemetry monitor.    Differential diagnoses considered includes, but is not limited to: SVT, paroxysmal A-fib,  electrolyte or metabolic derangement, dehydration    Will obtain the following tests: CBC, BMP, troponin, chest x-ray, EKG  Please see ED course for my independent review of these tests/imaging results.    Initial Medications/Therapeutics administered: None    Chronic conditions affecting care: Carotid artery dissection, CVA, subarachnoid hemorrhage    Workup and medications considered but not ordered: None    Social Determinants of Health that impacted care: None    ED Course as of 08/13/24 2140  ------------------------------------------------------------  Time: 08/13 1522  Comment: Labs are reassuring.  Negative troponin.  Electrolytes within normal limits.  Independently reviewed the chest x-ray images that show no evidence of focal opacity to suggest pneumonia.  No evidence of pulmonary edema.  I discussed patient's case with [facility] cardiology APN who agreed to help arrange for outpatient follow-up.  Patient scheduled for outpatient follow-up with Dr. Muñoz tomorrow at 11:30 AM.  Patient and family expressed understanding and agreement with this plan.  Return precautions provided and all questions answered.  Stable for discharge home at this time.            Disposition and Plan     Clinical Impression:  1. Palpitations        Disposition:  Discharge    Follow-up:  Jonas Muñoz MD  [address] [place] [id number]  [phone]    Go in 1 day(s)  Go to appointment tomorrow at 11:30am for evaluation and likely Holter Monitor      Medications Prescribed:  Discharge Medication List as of 8/13/2024  3:15 PM            This note may have been created using voice dictation technology and may include inadvertent errors.      Teresa Ly MD  Emergency Medicine

## (undated) NOTE — ED AVS SNAPSHOT
[facility] Emergency Department  [address] [place] [address]  Phone:  [phone]  Fax:  [phone]          Papa Swanson   MRN: [id number]    Department:  [facility] Emergency Department   Date of Visit:  7/1/2017 visiting [email or web address].    IF THERE IS ANY CHANGE OR WORSENING OF YOUR CONDITION, CALL YOUR PRIMARY CARE PHYSICIAN AT ONCE OR RETURN IMMEDIATELY TO THE EMERGENCY DEPARTMENT.     If you have been prescribed any medication(s), please fill your prescription

## (undated) NOTE — IP AVS SNAPSHOT
Patient Demographics     Address  [address] [address] Phone  [phone] [facility])      Emergency Contact(s)     Name Relation Home Work Mobile    Roxana,Diane Spouse [phone]      Steve Macias [phone]        Allergies Your medication list      TAKE these medications       Instructions Authorizing Provider Morning Afternoon Evening As Needed   atorvastatin 80 MG Tabs  Commonly known as:  LIPITOR  Next dose due:  07/14/17 9 PM      Take 1 tablet (80 mg total) by Lab Results Last 24 Hours      POTASSIUM [[id number]] (Normal)  Resulted: 07/14/17 4395, Result status: Final result   Ordering provider:   Cleo Cordova MD  07/13/17 2300 Resulting lab:  [facility]    Specimen Information    Type Source Collected On   Blo Order Status:  Completed Lab Status:  Final result Updated:  07/04/17 0719    Specimen:  Urine from Urine, clean catch      Urine Culture No Growth 1 Day    MRSA Culture Only Once [[id number]] Collected:  07/01/17 1431    Order Status:  Completed Lab [employer] of alcohol per week . He reports that he does not use drugs. Family History: History reviewed. No pertinent family history.     Allergies:   Sulfa Antibiotics           Medications:    No current facility-administered medications on file prior to encount Recent Labs   Lab  07/01/17   0740  07/01/17   1457   PTP  12.9  15.0*   INR  1.0  1.17*       Recent Labs   Lab  07/01/17   0740   TROP  0.01       Imaging: Imaging data reviewed in Epic. ASSESSMENT / PLAN:     1.  Acute left MCA stroke-patient edward Attending Provider:  Reese Mitchell*                                  Primary Care Physician:  Adis Marroquin   Admitting Diagnosis: Acute ischemic stroke [facility]) [I63.9]    Subjective:      Reason for Consultation: Impaired ADL and mobility dys Past Medical History:  [email or web address] reviewed. No pertinent past medical history.     Past Surgical History:  No date: REPAIR ING HERNIA,5+Y/O,REDUCIBL  No date: TONSILLECTOMY      [COMPLETED] potassium chloride (K-SOL) 40 meq/30 ml (10%) oral solutio [COMPLETED] Heparin Sodium (Porcine) 5000 UNIT/ML injection      [COMPLETED] Lidocaine HCl (XYLOCAINE) 1 % injection      [COMPLETED] Heparin Sodium (Porcine) 5000 UNIT/ML injection      [COMPLETED] Heparin Sodium (Porcine) 5000 UNIT/ML injection [COMPLETED] haloperidol lactate (HALDOL) 5 MG/ML injection 2 mg 2 mg Intravenous Once   LORazepam (ATIVAN) injection 1 mg 1 mg Intravenous Once   [COMPLETED] LORazepam (ATIVAN) 2 MG/ML injection      [COMPLETED] levETIRAcetam (KEPPRA) 1,000 mg in sodium ch Neck: No neck masses or thyroid enlargement/tenderness/nodules. Lungs:   Resonant, clear breath sounds, quiet accessory muscles. Chest wall:  No tenderness or deformity. Cardiovascular:  Heart with regular rate rhythm, no murmurs appreciated.  Rad acute inpatient rehabilitation, working with PT/OT/SLP to upgrade present functional status, provide family training, assess home equipment and assistive device needs.             24 hr rehab nursing also beneficial for medication management, pressure sore Number of Visits to Meet Established Goals: 10[AR.2] (additional 5 sessions added 7/10)[AR.1]    Presenting Problem: acute ischemic stroke[AR.2]    Problem List[AR.1]  Principal Problem:    Acute ischemic stroke [facility])  Active Problems:    Subarachnoid blee PT Approx Degree of Impairment Score: 61.29%   Standardized Score (AM-PAC Scale): 38.1   CMS Modifier (G-Code): CL[AR.2]    FUNCTIONAL ABILITY STATUS  Gait Assessment[AR.1]   Gait Assistance: Maximum assistance (per FIM distance - otherwise Mod-Max)  Dista Mod-Max A for RLE swing and quad activation with RLE in stance phase. Results of the AM-PAC \"6 clicks\" Inpatient Daily Mobility Short Form for the patient is 61.29% degree of basic mobility impairment.   Research supports that patients with this level o :  Opal Rocha PTA (Physical Therapy Assistant)        PHYSICAL THERAPY TREATMENT NOTE - INPATIENT    Room Number: 1570/2447-V     Session:[AR.1] 8  Number of Visits to Meet Established Goals: 10[AR.2] (additional 5 sessions added 7/10)[AR.1] -   Need to walk in hospital room?: A Lot   -   Climbing 3-5 steps with a railing?: Total[AR.2]       AM-PAC Score:[AR.1]  Raw Score: 14   PT Approx Degree of Impairment Score: 61.29%   Standardized Score (AM-PAC Scale): 38.1   CMS Modifier (G-Code): CL[AR stroke. Pt still requiring Mod-Max A for RLE swing and quad activation with RLE in stance phase. Results of the AM-PAC \"6 clicks\" Inpatient Daily Mobility Short Form for the patient is 61.29% degree of basic mobility impairment.   Research supports [place] Room Number: 9053/6072-K     Session: 7  Number of Visits to Meet Established Goals: 10 (additional 5 sessions added 7/10)    Presenting Problem: acute ischemic stroke    Problem List  Principal Problem:    Acute ischemic stroke [facility])  Active Problems: Gait Assessment   Gait Assistance: Maximum assistance (per FIM distance - otherwise)  Distance (ft): 50-40-50  Assistive Device:  (kaycee-walker)   Pattern: R Foot drag;R Foot flat;Scissoring  Stoop/Curb Assistance: Not tested  Comment : above per FIM    Ski PT Discharge Recommendations: Acute rehabilitation     PLAN  PT Treatment Plan: Bed mobility; Endurance; Patient education; Family education;Gait training;Neuromuscular re-educate;Range of motion;Strengthening;Transfer training;Balance training  Rehab [place] OBJECTIVE  Precautions: Seizure (-140)    WEIGHT BEARING RESTRICTION  Weight Bearing Restriction: None                PAIN ASSESSMENT   Ratin  Location: denies       BALANCE lean. Pt requires Mod-Max A on R to assist R LE advancement, proper positioning of R foot, and improved R quad activation. [MENEU.1] Pt observed with LOB x 4 anteriorly due to R foot drag and inability to self-correct.  Pt encouraged with use of pacing and util education;Gait training;Neuromuscular re-educate;Range of motion;Strengthening;Transfer training;Balance training  Rehab Potential : Good  Frequency (Obs): Daily    CURRENT GOALS      Goal #1 Pt will sit-stand with MOD assist. - MET  NEW: MIN   Goal #4 Promise Galan 4. Subarachnoid hemorrhage slightly diminished from yesterday. Evolving left MCA infarct, 1 mm increase in midline shift. No new hemorrhage.       Problem List  Principal Problem:    Acute ischemic stroke [facility])  Active Problems:    Subarachnoid bleed ([address] hallway, and bathroom with kaycee walker and dep assist for moving right leg with chair follow, therapist assist pt to complete grooming up in bathroom, pt standing at sink with initially mod assist and then min assist to supervision to maintain balance once Patient will perform upper body dressing:  with min assist--progressing  Patient will perform lower body dressing:  with mod assist--MET 7/12     Functional Transfer Goals  Patient will perform all functional transfers:  with mod assist--progressing     CHERYL Acute ischemic stroke [facility])  Active Problems:    Subarachnoid bleed (HCC)    Hypokalemia    Cerebral edema (HCC)    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 No pertinent past medical history.     Past Surgical Histor Activity aimed to improve writing performance and coordination with L hand. Pt able to complete with increased time and verbal cues to promote further accuracy.  Pt completed activity to improve visual attention, attention to detail, and ability to follow m acute rehab setting. This would aim to maximize independence/safety with self-care, t/f's, community mobility, and home management activities.   Especially, since pt was completely independent PTA.[BB.3]      OT Discharge Recommendations: Acute rehabilitat Admission Date: 7/1/2017  Evaluation Date: 07/06/17  Radiologist: Dominga Hsu    Dear Dr. Phuong Mayo,  This letter is to inform you of Ty Silvestre Videofluoroscopic Swallowing Study results and/or plan of treatment.     PLAN   Diet Recommendations - Solids: Mec Patient Positioned: Upright;[facility] chair. Patient Viewed: Lateral.  Patient Alertness: Lethargic; Constant cues requied to stay on task. Consistencies Presented: Thin liquids; Nectar thick liquids; Honey thick liquids;Puree;Hard solid to assess oropharyngeal s Residue Severity, Location: Mild;Valleculae;Pyriform sinuses; Throughout pharynx  Cleared/Reduced with: Secondary swallow  Laryngeal Penetration:  (None)  Tracheal Aspiration:  (None)  Strategy(ies) Implemented (Puree):  Slow rate;Small bites and sips  Effec after meal, Eliminate distractions, Supervision with meals with min assistance 100 % of the time across 1-2 sessions.    Goal #4 Communication evaluation to be completed     EDUCATION/INSTRUCTION  Reviewed results and recommendations with patient/family/car 80% after model. Melodic Intonation Therapy (JENNY) was explored- patient able to sing approx 70% of familiar song in unison with clinician. Difficulty communicating basic wants/needs. See below for details of this session.     Swallow: unable to participat Goal #2 The patient will identify concrete object in a field of 2-6 with max cues with 80 % accuracy within 3-4 session(s). IN PROGRESS   Goal #3 The patient will answer [place], Abstract yes/no questions with 80 % accuracy within max 3-4 session(s).  IN P

## (undated) NOTE — IP AVS SNAPSHOT
[address]            (For Outpatient Use Only) Initial Admit Date: 7/1/2017   Inpt/Obs Admit Date: Inpt: 7/1/17 / Obs: N/A   Discharge Date:    Vlad Cohen:  [de-identified]   MRN: [de-identified]   CSN: [id number]        ENCOUNTER  Patient C Subscriber Name:  Yulia Mireles :    Subscriber ID:  Pt Rel to Subscriber:    Hospital Account Financial Class: Medicare    2017